# Patient Record
Sex: FEMALE | Race: WHITE | Employment: FULL TIME | URBAN - METROPOLITAN AREA
[De-identification: names, ages, dates, MRNs, and addresses within clinical notes are randomized per-mention and may not be internally consistent; named-entity substitution may affect disease eponyms.]

---

## 2017-10-10 ENCOUNTER — OFFICE VISIT (OUTPATIENT)
Dept: INTERNAL MEDICINE CLINIC | Facility: CLINIC | Age: 27
End: 2017-10-10

## 2017-10-10 VITALS
TEMPERATURE: 98 F | DIASTOLIC BLOOD PRESSURE: 73 MMHG | SYSTOLIC BLOOD PRESSURE: 107 MMHG | WEIGHT: 142 LBS | BODY MASS INDEX: 28.25 KG/M2 | HEIGHT: 59.5 IN | HEART RATE: 71 BPM

## 2017-10-10 DIAGNOSIS — R39.9 UTI SYMPTOMS: Primary | ICD-10-CM

## 2017-10-10 PROCEDURE — 99203 OFFICE O/P NEW LOW 30 MIN: CPT | Performed by: INTERNAL MEDICINE

## 2017-10-10 PROCEDURE — 81025 URINE PREGNANCY TEST: CPT | Performed by: INTERNAL MEDICINE

## 2017-10-10 RX ORDER — SULFAMETHOXAZOLE AND TRIMETHOPRIM 800; 160 MG/1; MG/1
1 TABLET ORAL 2 TIMES DAILY
Qty: 6 TABLET | Refills: 0 | Status: SHIPPED | OUTPATIENT
Start: 2017-10-10 | End: 2017-10-13

## 2017-10-10 NOTE — PROGRESS NOTES
Nuha Hahn is a 24year old female. No chief complaint on file. HPI:   UTI symptoms X 1 day  Burning , pain while urination ,urgency, frequent urination present  Previous hx of UTI, last episode was 2years ago  No fever, no rigors.  Reports s NAD. .  GI: normal BS ,no masses or tenderness. Suprapubic tenderness present, no flank tenderness   EXTREMITIES: no cyanosis, or edema.   NUERO: grossly intact          ASSESSMENT AND PLAN:   Diagnoses and all orders for this visit:    UTI symptoms  We katharina

## 2017-12-11 ENCOUNTER — TELEPHONE (OUTPATIENT)
Dept: INTERNAL MEDICINE CLINIC | Facility: CLINIC | Age: 27
End: 2017-12-11

## 2017-12-11 DIAGNOSIS — Z11.1 SCREENING-PULMONARY TB: Primary | ICD-10-CM

## 2017-12-11 NOTE — TELEPHONE ENCOUNTER
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      Pt currently scheduled for 12/13 at 4 pm.

## 2017-12-12 ENCOUNTER — NURSE ONLY (OUTPATIENT)
Dept: INTERNAL MEDICINE CLINIC | Facility: CLINIC | Age: 27
End: 2017-12-12

## 2017-12-12 DIAGNOSIS — Z11.1 ENCOUNTER FOR PPD SKIN TEST READING: Primary | ICD-10-CM

## 2017-12-12 PROCEDURE — 86580 TB INTRADERMAL TEST: CPT | Performed by: INTERNAL MEDICINE

## 2017-12-12 NOTE — PROGRESS NOTES
Pt presents for TB placement . Order in the chart for Quantiferon Pt declines would like the TB placement due to not knowing if insurance will cover quantiferon. Name and  verified. TB placement on right forearm .  Pt advised to come back for reading on

## 2017-12-14 ENCOUNTER — NURSE ONLY (OUTPATIENT)
Dept: INTERNAL MEDICINE CLINIC | Facility: CLINIC | Age: 27
End: 2017-12-14

## 2017-12-14 DIAGNOSIS — Z11.1 ENCOUNTER FOR PPD SKIN TEST READING: Primary | ICD-10-CM

## 2018-02-13 ENCOUNTER — OFFICE VISIT (OUTPATIENT)
Dept: INTERNAL MEDICINE CLINIC | Facility: CLINIC | Age: 28
End: 2018-02-13

## 2018-02-13 VITALS
SYSTOLIC BLOOD PRESSURE: 108 MMHG | WEIGHT: 137.19 LBS | BODY MASS INDEX: 27.29 KG/M2 | HEIGHT: 59.5 IN | HEART RATE: 81 BPM | TEMPERATURE: 98 F | DIASTOLIC BLOOD PRESSURE: 65 MMHG

## 2018-02-13 DIAGNOSIS — J06.9 UPPER RESPIRATORY TRACT INFECTION, UNSPECIFIED TYPE: Primary | ICD-10-CM

## 2018-02-13 LAB
CONTROL LINE PRESENT WITH A CLEAR BACKGROUND (YES/NO): YES YES/NO
KIT LOT #: NORMAL NUMERIC
STREP GRP A CUL-SCR: NEGATIVE

## 2018-02-13 PROCEDURE — 87880 STREP A ASSAY W/OPTIC: CPT | Performed by: PHYSICIAN ASSISTANT

## 2018-02-13 PROCEDURE — 99213 OFFICE O/P EST LOW 20 MIN: CPT | Performed by: PHYSICIAN ASSISTANT

## 2018-02-13 RX ORDER — AZITHROMYCIN 250 MG/1
TABLET, FILM COATED ORAL
Qty: 6 TABLET | Refills: 0 | Status: SHIPPED | OUTPATIENT
Start: 2018-02-13 | End: 2018-03-02

## 2018-02-13 NOTE — PROGRESS NOTES
HPI:    Patient ID: Ruslan Billy is a 24year old female. HPI  Patient presents today with upper respiratory symptoms for the last few days. Over the weekend she developed chills and flushing but no definite fever.  Yesterday she developed sore th rhythm and normal heart sounds. Pulmonary/Chest: Effort normal and breath sounds normal. She has no wheezes. She has no rales. Lymphadenopathy:     She has no cervical adenopathy. Neurological: She is alert. Skin: Skin is warm and dry.    Psychiatr

## 2018-02-13 NOTE — PATIENT INSTRUCTIONS
Drink plenty of fluids to stay hydrated - at least 2 liters per day   Wash hands frequently, cover your cough or sneeze, do not share drinks with others.   Cepacol lozenges, Chloroseptic throat spray, and salt water gargles (1 tsp salt in 8 oz lukewarm wate

## 2018-02-21 ENCOUNTER — LAB REQUISITION (OUTPATIENT)
Dept: LAB | Facility: HOSPITAL | Age: 28
End: 2018-02-21
Payer: COMMERCIAL

## 2018-02-21 DIAGNOSIS — Z11.3 ENCOUNTER FOR SCREENING FOR INFECTIONS WITH PREDOMINANTLY SEXUAL MODE OF TRANSMISSION: ICD-10-CM

## 2018-02-21 PROCEDURE — 87340 HEPATITIS B SURFACE AG IA: CPT | Performed by: OBSTETRICS & GYNECOLOGY

## 2018-02-21 PROCEDURE — 86803 HEPATITIS C AB TEST: CPT | Performed by: OBSTETRICS & GYNECOLOGY

## 2018-02-21 PROCEDURE — 87389 HIV-1 AG W/HIV-1&-2 AB AG IA: CPT | Performed by: OBSTETRICS & GYNECOLOGY

## 2018-02-21 PROCEDURE — 86780 TREPONEMA PALLIDUM: CPT | Performed by: OBSTETRICS & GYNECOLOGY

## 2018-02-22 ENCOUNTER — LAB REQUISITION (OUTPATIENT)
Dept: LAB | Facility: HOSPITAL | Age: 28
End: 2018-02-22
Payer: COMMERCIAL

## 2018-02-22 DIAGNOSIS — Z11.3 ENCOUNTER FOR SCREENING FOR INFECTIONS WITH PREDOMINANTLY SEXUAL MODE OF TRANSMISSION: ICD-10-CM

## 2018-02-22 DIAGNOSIS — Z11.51 ENCOUNTER FOR SCREENING FOR HUMAN PAPILLOMAVIRUS (HPV): ICD-10-CM

## 2018-02-22 DIAGNOSIS — Z01.419 ENCOUNTER FOR GYNECOLOGICAL EXAMINATION WITHOUT ABNORMAL FINDING: ICD-10-CM

## 2018-02-22 LAB
HBV SURFACE AG SERPL QL IA: NONREACTIVE
HCV AB SERPL QL IA: NONREACTIVE
HIV1+2 AB SERPL QL IA: NONREACTIVE

## 2018-02-22 PROCEDURE — 87491 CHLMYD TRACH DNA AMP PROBE: CPT | Performed by: OBSTETRICS & GYNECOLOGY

## 2018-02-22 PROCEDURE — 87624 HPV HI-RISK TYP POOLED RSLT: CPT | Performed by: OBSTETRICS & GYNECOLOGY

## 2018-02-22 PROCEDURE — 88175 CYTOPATH C/V AUTO FLUID REDO: CPT | Performed by: OBSTETRICS & GYNECOLOGY

## 2018-02-22 PROCEDURE — 87591 N.GONORRHOEAE DNA AMP PROB: CPT | Performed by: OBSTETRICS & GYNECOLOGY

## 2018-02-23 LAB
HPV I/H RISK 1 DNA SPEC QL NAA+PROBE: NEGATIVE
T PALLIDUM AB SER QL: NEGATIVE

## 2018-03-01 ENCOUNTER — NURSE TRIAGE (OUTPATIENT)
Dept: OTHER | Age: 28
End: 2018-03-01

## 2018-03-01 RX ORDER — SULFAMETHOXAZOLE AND TRIMETHOPRIM 800; 160 MG/1; MG/1
1 TABLET ORAL 2 TIMES DAILY
Qty: 6 TABLET | Refills: 0 | Status: SHIPPED | OUTPATIENT
Start: 2018-03-01 | End: 2018-03-04

## 2018-03-01 NOTE — TELEPHONE ENCOUNTER
Action Requested: Summary for Provider     []  Critical Lab, Recommendations Needed  [x] Need Additional Advice  []   FYI    []   Need Orders  [x] Need Medications Sent to Pharmacy  []  Other     SUMMARY: Pt is requesting meds to pharmacy.  She is unable to

## 2018-03-01 NOTE — TELEPHONE ENCOUNTER
Dr. Deonte Nunez, please advise regarding pt request for meds. She saw you on 10/10/17 for UTI. She is unable to come to office today for appt or to drop of urine specimen due to work.

## 2018-03-01 NOTE — TELEPHONE ENCOUNTER
She should be seen in office in next couple of days. She has to establish with a PCP.   Rx sent to pharmacy

## 2018-03-01 NOTE — TELEPHONE ENCOUNTER
I have never seen this patient. She is seen. Olayinka once.   She should be seen by 1 of the physicians as I cannot prescribe something to patient that I have never seen

## 2018-03-02 ENCOUNTER — OFFICE VISIT (OUTPATIENT)
Dept: INTERNAL MEDICINE CLINIC | Facility: CLINIC | Age: 28
End: 2018-03-02

## 2018-03-02 VITALS
DIASTOLIC BLOOD PRESSURE: 73 MMHG | HEART RATE: 90 BPM | SYSTOLIC BLOOD PRESSURE: 111 MMHG | TEMPERATURE: 98 F | HEIGHT: 59.5 IN | WEIGHT: 139.19 LBS | BODY MASS INDEX: 27.69 KG/M2

## 2018-03-02 DIAGNOSIS — N30.00 ACUTE CYSTITIS WITHOUT HEMATURIA: Primary | ICD-10-CM

## 2018-03-02 LAB
C TRACH DNA SPEC QL NAA+PROBE: NEGATIVE
N GONORRHOEA DNA SPEC QL NAA+PROBE: NEGATIVE

## 2018-03-02 PROCEDURE — 99212 OFFICE O/P EST SF 10 MIN: CPT | Performed by: INTERNAL MEDICINE

## 2018-03-02 PROCEDURE — 99213 OFFICE O/P EST LOW 20 MIN: CPT | Performed by: INTERNAL MEDICINE

## 2018-03-02 NOTE — PROGRESS NOTES
Oj Forrester is a 24year old female. Patient presents with:  UTI: f/u      HPI:   Patient is here for follow-up of UTI. She was having symptoms of lower abdominal pain, urinary burning and increased frequency that started on Monday.  She was drink (BP Location: Left arm, Cuff Size: adult)   Pulse 90   Temp 97.8 °F (36.6 °C) (Oral)   Ht 4' 11.5\" (1.511 m)   Wt 139 lb 3.2 oz (63.1 kg)   BMI 27.64 kg/m²   GENERAL: well developed, well nourished, NAD.   NECK: supple, no thyromegaly, no thyroid nodules,

## 2018-03-02 NOTE — TELEPHONE ENCOUNTER
Patient calling - verified patient's name and  - informed patient of doctor's note - patient verbalized understanding. Created appt with Dr Columba Mitchell for tomorrow at 4:20. Patient verbalized intent to comply.  Advised patient to call back if symptoms wors

## 2018-03-15 ENCOUNTER — OFFICE VISIT (OUTPATIENT)
Dept: INTERNAL MEDICINE CLINIC | Facility: CLINIC | Age: 28
End: 2018-03-15

## 2018-03-15 VITALS
BODY MASS INDEX: 27.09 KG/M2 | TEMPERATURE: 97 F | SYSTOLIC BLOOD PRESSURE: 105 MMHG | HEART RATE: 62 BPM | HEIGHT: 60 IN | WEIGHT: 138 LBS | DIASTOLIC BLOOD PRESSURE: 66 MMHG

## 2018-03-15 DIAGNOSIS — Z00.00 ANNUAL PHYSICAL EXAM: Primary | ICD-10-CM

## 2018-03-15 PROCEDURE — 99395 PREV VISIT EST AGE 18-39: CPT | Performed by: INTERNAL MEDICINE

## 2018-03-15 NOTE — PROGRESS NOTES
Mery Kelly is a 24year old female. Patient presents with:  Physical      HPI:   [facility] is here for annual physical.  She denies any complaint. Completed course of antibiotics for recent UTI in the first week of march. Her symptoms are better. numbness or headaches  ENDO: No fatigue, polyuria, polydipsia, tremors. ALLERGY/ASTHMA: No seasonal allergy or asthma. HEME: no anemia, no abnormal bleeding or easy bruising. PSYCH: Denies anxiety or feeling depressed.         EXAM:   /66 (BP Locat

## 2018-08-06 ENCOUNTER — OFFICE VISIT (OUTPATIENT)
Dept: INTERNAL MEDICINE CLINIC | Facility: CLINIC | Age: 28
End: 2018-08-06

## 2018-08-06 ENCOUNTER — NURSE TRIAGE (OUTPATIENT)
Dept: OTHER | Age: 28
End: 2018-08-06

## 2018-08-06 VITALS
DIASTOLIC BLOOD PRESSURE: 69 MMHG | HEART RATE: 67 BPM | BODY MASS INDEX: 28.47 KG/M2 | SYSTOLIC BLOOD PRESSURE: 106 MMHG | HEIGHT: 60 IN | WEIGHT: 145 LBS

## 2018-08-06 DIAGNOSIS — R39.9 UTI SYMPTOMS: Primary | ICD-10-CM

## 2018-08-06 LAB
APPEARANCE: CLEAR
BILIRUBIN: NEGATIVE
GLUCOSE (URINE DIPSTICK): NEGATIVE MG/DL
MULTISTIX LOT#: NORMAL NUMERIC
NITRITE, URINE: NEGATIVE
OCCULT BLOOD: NEGATIVE
PH, URINE: 5 (ref 4.5–8)
PROTEIN (URINE DIPSTICK): NEGATIVE MG/DL
SPECIFIC GRAVITY: 1.01 (ref 1–1.03)
URINE-COLOR: YELLOW
UROBILINOGEN,SEMI-QN: NEGATIVE MG/DL (ref 0–1.9)

## 2018-08-06 PROCEDURE — 99213 OFFICE O/P EST LOW 20 MIN: CPT | Performed by: INTERNAL MEDICINE

## 2018-08-06 PROCEDURE — 99212 OFFICE O/P EST SF 10 MIN: CPT | Performed by: INTERNAL MEDICINE

## 2018-08-06 PROCEDURE — 81002 URINALYSIS NONAUTO W/O SCOPE: CPT | Performed by: INTERNAL MEDICINE

## 2018-08-06 NOTE — TELEPHONE ENCOUNTER
Action Requested: Summary for Provider     []  Critical Lab, Recommendations Needed  [] Need Additional Advice  []   FYI    []   Need Orders  [] Need Medications Sent to Pharmacy  []  Other     SUMMARY: appt scheduled today to see Dr Giuseppe Wright.   PCP no acces

## 2018-08-06 NOTE — PROGRESS NOTES
HPI:    Patient ID: Shannon Ryder is a 24year old female. Pt woke last night with urinary discomfort. Dysuria    This is a new problem. The current episode started today. The problem occurs every urination. The problem has been resolved.  The no CVA tenderness. Neurological: She is alert and oriented to person, place, and time. ASSESSMENT/PLAN:     1. UTI symptoms  Pt had mild symptoms this morning which resolved. Will send the urine for culture and treat only if it is positive.

## 2018-08-10 ENCOUNTER — TELEPHONE (OUTPATIENT)
Dept: INTERNAL MEDICINE CLINIC | Facility: CLINIC | Age: 28
End: 2018-08-10

## 2018-08-10 NOTE — TELEPHONE ENCOUNTER
Pt called in requesting to speak with a nurse in regards to her lab results from 8/6.   Please advise

## 2018-08-14 ENCOUNTER — PATIENT MESSAGE (OUTPATIENT)
Dept: INTERNAL MEDICINE CLINIC | Facility: CLINIC | Age: 28
End: 2018-08-14

## 2018-08-15 ENCOUNTER — TELEPHONE (OUTPATIENT)
Dept: OTHER | Age: 28
End: 2018-08-15

## 2018-08-15 ENCOUNTER — APPOINTMENT (OUTPATIENT)
Dept: LAB | Facility: HOSPITAL | Age: 28
End: 2018-08-15
Attending: INTERNAL MEDICINE
Payer: COMMERCIAL

## 2018-08-15 ENCOUNTER — NURSE TRIAGE (OUTPATIENT)
Dept: OTHER | Age: 28
End: 2018-08-15

## 2018-08-15 ENCOUNTER — OFFICE VISIT (OUTPATIENT)
Dept: INTERNAL MEDICINE CLINIC | Facility: CLINIC | Age: 28
End: 2018-08-15

## 2018-08-15 VITALS
HEART RATE: 78 BPM | WEIGHT: 143.19 LBS | TEMPERATURE: 98 F | DIASTOLIC BLOOD PRESSURE: 70 MMHG | SYSTOLIC BLOOD PRESSURE: 119 MMHG | BODY MASS INDEX: 28.11 KG/M2 | HEIGHT: 60 IN

## 2018-08-15 DIAGNOSIS — Z11.3 SCREENING FOR STD (SEXUALLY TRANSMITTED DISEASE): ICD-10-CM

## 2018-08-15 DIAGNOSIS — K21.9 GASTROESOPHAGEAL REFLUX DISEASE, ESOPHAGITIS PRESENCE NOT SPECIFIED: Primary | ICD-10-CM

## 2018-08-15 PROCEDURE — 87340 HEPATITIS B SURFACE AG IA: CPT

## 2018-08-15 PROCEDURE — 36415 COLL VENOUS BLD VENIPUNCTURE: CPT

## 2018-08-15 PROCEDURE — 86780 TREPONEMA PALLIDUM: CPT

## 2018-08-15 PROCEDURE — 99212 OFFICE O/P EST SF 10 MIN: CPT | Performed by: INTERNAL MEDICINE

## 2018-08-15 PROCEDURE — 87389 HIV-1 AG W/HIV-1&-2 AB AG IA: CPT

## 2018-08-15 PROCEDURE — 99213 OFFICE O/P EST LOW 20 MIN: CPT | Performed by: INTERNAL MEDICINE

## 2018-08-15 PROCEDURE — 87591 N.GONORRHOEAE DNA AMP PROB: CPT

## 2018-08-15 PROCEDURE — 86803 HEPATITIS C AB TEST: CPT

## 2018-08-15 PROCEDURE — 87491 CHLMYD TRACH DNA AMP PROBE: CPT

## 2018-08-15 RX ORDER — PANTOPRAZOLE SODIUM 40 MG/1
40 TABLET, DELAYED RELEASE ORAL
Qty: 30 TABLET | Refills: 0 | Status: SHIPPED | OUTPATIENT
Start: 2018-08-15 | End: 2018-09-14

## 2018-08-15 NOTE — PROGRESS NOTES
Ross Mathew is a 34year old female. Patient presents with:  Abdominal Pain  STD      HPI:     HPI  Patient is here with complaints of abdominal pain since monday. She was thinking it was something she probably ate.  Pain is aggravated with eating Constitutional: Negative for chills, fever, malaise/fatigue and weight loss. HENT: Negative. Eyes: Negative. Respiratory: Negative. Cardiovascular: Negative. Gastrointestinal: Positive for abdominal pain and nausea.  Negative for blood in st week.  If no improvement of symptoms, develops fever, chills, vomiting, change in the bowel movements or worsening of abdominal pain, call immediately. Will consider imaging. Screening for STD (sexually transmitted disease)  -     HCV ANTIBODY;  Future

## 2018-08-15 NOTE — TELEPHONE ENCOUNTER
Please inform the patient to follow-up in the office if she has persisting symptoms to be evaluated.

## 2018-08-15 NOTE — TELEPHONE ENCOUNTER
Action Requested: Summary for Provider     []  Critical Lab, Recommendations Needed  [x] Need Additional Advice  []   FYI    []   Need Orders  [] Need Medications Sent to Pharmacy  []  Other     SUMMARY: see [employer] message below.  Pt states she started hav

## 2018-08-15 NOTE — TELEPHONE ENCOUNTER
Patient calling nandini stated she took a Chlamydia/Gonococcus urine test today. She took I upon herself to use a wet wipe unused in the bathroom to wipe herself before the test.  She is questioning if the results will come back inaccurate.     I called the

## 2018-08-15 NOTE — TELEPHONE ENCOUNTER
----- Message from Sam Salter sent at 8/14/2018  9:08 PM CDT -----  Regarding: Non-Urgent Medical Question  Contact: [phone]  Over the last two days I have been experiencing a slight pain/discomfort in my chest under the bra line and on the

## 2018-08-15 NOTE — TELEPHONE ENCOUNTER
From: Stacey Hassan  To: Rakel Mccormack MD  Sent: 8/14/2018 9:08 PM CDT  Subject: Non-Urgent Medical Question    Over the last two days I have been experiencing a slight pain/discomfort in my chest under the bra line and on the left side of my abdomin

## 2018-08-16 LAB
C TRACH DNA SPEC QL NAA+PROBE: NEGATIVE
HBV SURFACE AG SERPL QL IA: NONREACTIVE
HCV AB SERPL QL IA: NONREACTIVE
HIV1+2 AB SERPL QL IA: NONREACTIVE
N GONORRHOEA DNA SPEC QL NAA+PROBE: NEGATIVE

## 2018-08-17 LAB — T PALLIDUM AB SER QL: NEGATIVE

## 2018-08-20 ENCOUNTER — TELEPHONE (OUTPATIENT)
Dept: OTHER | Age: 28
End: 2018-08-20

## 2018-08-20 NOTE — TELEPHONE ENCOUNTER
HIV AG AB COMBO   Order: [id number]   Collected:  8/15/2018  4:23 PM Status:  Final result Dx:  Screening for STD (sexually transmitt. .. Notes recorded by Crista Paget, LPN on 2/23/4627 at 2:72 PM CDT  LMTCB.  If patient calls please transfer to [id number]

## 2018-10-04 ENCOUNTER — OFFICE VISIT (OUTPATIENT)
Dept: INTERNAL MEDICINE CLINIC | Facility: CLINIC | Age: 28
End: 2018-10-04

## 2018-10-04 VITALS
HEIGHT: 60 IN | BODY MASS INDEX: 28.01 KG/M2 | WEIGHT: 142.69 LBS | HEART RATE: 93 BPM | DIASTOLIC BLOOD PRESSURE: 73 MMHG | SYSTOLIC BLOOD PRESSURE: 104 MMHG | TEMPERATURE: 98 F

## 2018-10-04 DIAGNOSIS — J06.9 UPPER RESPIRATORY TRACT INFECTION, UNSPECIFIED TYPE: Primary | ICD-10-CM

## 2018-10-04 PROCEDURE — 99212 OFFICE O/P EST SF 10 MIN: CPT | Performed by: INTERNAL MEDICINE

## 2018-10-04 PROCEDURE — 99213 OFFICE O/P EST LOW 20 MIN: CPT | Performed by: INTERNAL MEDICINE

## 2018-10-04 RX ORDER — AZITHROMYCIN 250 MG/1
TABLET, FILM COATED ORAL
Qty: 6 TABLET | Refills: 0 | Status: SHIPPED | OUTPATIENT
Start: 2018-10-04 | End: 2018-10-11 | Stop reason: ALTCHOICE

## 2018-10-04 NOTE — PROGRESS NOTES
Charo Zavala is a 34year old female. Patient presents with:  Cold: pt c/o runny nose, sore throat, clogged ears x 2 days  Diarrhea      HPI:     HPI    Patient is here with symtoms of nasal congestion, sore throat and clogged ears for last 2 days. redness. Respiratory: Positive for cough. Negative for hemoptysis, sputum production, shortness of breath and wheezing. Cardiovascular: Negative for chest pain and leg swelling. Gastrointestinal: Positive for diarrhea.  Negative for abdominal pain, c improve in 1 or 2 days. Informed the patient to call me back when she returns if the symptoms fail to improve. She will be back by Sunday. Can continue Flonase and take OTC Allegra/Claritin for running nose. Stay hydrated.   Drink plenty of fluids to st

## 2018-10-08 ENCOUNTER — TELEPHONE (OUTPATIENT)
Dept: OTHER | Age: 28
End: 2018-10-08

## 2018-10-08 DIAGNOSIS — R05.9 COUGH: Primary | ICD-10-CM

## 2018-10-08 NOTE — TELEPHONE ENCOUNTER
Talked to the patinet. If symptoms persists 1 more day with no improvement, please do a cxr. Ordered in epic.she verbalizes understanding. Denies fever or chills. Symptoms are moderately better since taking the antibiotics.

## 2018-10-08 NOTE — TELEPHONE ENCOUNTER
Received a call from patient who reports she saw Dr. Sebastian Uribe on 10/4/18 for a URI and was prescribed azithromycin. Is taking the azithromycin as prescribed. Patient reports she is still coughing up yellow-colored sputum and nasal drainage.  Patient also repor

## 2018-10-11 ENCOUNTER — HOSPITAL ENCOUNTER (OUTPATIENT)
Dept: GENERAL RADIOLOGY | Facility: HOSPITAL | Age: 28
Discharge: HOME | End: 2018-10-11
Attending: INTERNAL MEDICINE
Payer: COMMERCIAL

## 2018-10-11 ENCOUNTER — OFFICE VISIT (OUTPATIENT)
Dept: INTERNAL MEDICINE CLINIC | Facility: CLINIC | Age: 28
End: 2018-10-11

## 2018-10-11 VITALS
HEIGHT: 60 IN | SYSTOLIC BLOOD PRESSURE: 105 MMHG | TEMPERATURE: 98 F | BODY MASS INDEX: 28.27 KG/M2 | WEIGHT: 144 LBS | HEART RATE: 68 BPM | DIASTOLIC BLOOD PRESSURE: 68 MMHG

## 2018-10-11 DIAGNOSIS — R05.9 COUGH: ICD-10-CM

## 2018-10-11 DIAGNOSIS — R05.9 COUGH: Primary | ICD-10-CM

## 2018-10-11 PROCEDURE — 71046 X-RAY EXAM CHEST 2 VIEWS: CPT | Performed by: INTERNAL MEDICINE

## 2018-10-11 PROCEDURE — 99213 OFFICE O/P EST LOW 20 MIN: CPT | Performed by: INTERNAL MEDICINE

## 2018-10-11 PROCEDURE — 99212 OFFICE O/P EST SF 10 MIN: CPT | Performed by: INTERNAL MEDICINE

## 2018-10-11 NOTE — PROGRESS NOTES
Peggy Rogers is a 34year old female. Patient presents with:  URI: f/u patient is requesting a chest x-ray      HPI:     HPI   Patient is here for follow-up of cough. She was seen last week for cough, sweats and chest discomfort.   She was given a Neurological: Negative for dizziness and headaches. Endo/Heme/Allergies: Negative for environmental allergies. Psychiatric/Behavioral: The patient does not have insomnia.           EXAM:   /68 (BP Location: Right arm, Patient Position: Sitting,

## 2018-12-20 ENCOUNTER — OFFICE VISIT (OUTPATIENT)
Dept: INTERNAL MEDICINE CLINIC | Facility: CLINIC | Age: 28
End: 2018-12-20

## 2018-12-20 VITALS
HEART RATE: 69 BPM | HEIGHT: 60 IN | DIASTOLIC BLOOD PRESSURE: 71 MMHG | SYSTOLIC BLOOD PRESSURE: 121 MMHG | TEMPERATURE: 98 F | BODY MASS INDEX: 27.8 KG/M2 | WEIGHT: 141.63 LBS

## 2018-12-20 DIAGNOSIS — K12.0 CANKER SORES ORAL: Primary | ICD-10-CM

## 2018-12-20 DIAGNOSIS — N89.8 VAGINAL DRYNESS: ICD-10-CM

## 2018-12-20 PROCEDURE — 99213 OFFICE O/P EST LOW 20 MIN: CPT | Performed by: INTERNAL MEDICINE

## 2018-12-20 PROCEDURE — 99212 OFFICE O/P EST SF 10 MIN: CPT | Performed by: INTERNAL MEDICINE

## 2018-12-20 NOTE — PROGRESS NOTES
Nunu Esquivel is a 34year old female. Patient presents with:  Mouth Cold Sores (respiratory/integumentary)      HPI:     HPI  Patient is here for open ulcer under her tongue. It started 3 days ago. She usually gets oral ulcers.   She never had it and headaches. Endo/Heme/Allergies: Negative for environmental allergies. Psychiatric/Behavioral: The patient does not have insomnia. EXAM:   /71 (BP Location: Right arm, Patient Position: Sitting, Cuff Size: adult)   Pulse 69   Temp 98.

## 2018-12-23 ENCOUNTER — E-VISIT (OUTPATIENT)
Dept: FAMILY MEDICINE CLINIC | Facility: CLINIC | Age: 28
End: 2018-12-23

## 2018-12-23 DIAGNOSIS — R30.0 DYSURIA: Primary | ICD-10-CM

## 2018-12-23 PROCEDURE — 98969 ONLINE SERVICE BY HC PRO: CPT | Performed by: NURSE PRACTITIONER

## 2018-12-23 RX ORDER — CEPHALEXIN 500 MG/1
500 TABLET ORAL 2 TIMES DAILY
Qty: 14 TABLET | Refills: 0 | Status: SHIPPED | OUTPATIENT
Start: 2018-12-23 | End: 2018-12-30

## 2019-01-29 ENCOUNTER — OFFICE VISIT (OUTPATIENT)
Dept: INTERNAL MEDICINE CLINIC | Facility: CLINIC | Age: 29
End: 2019-01-29

## 2019-01-29 ENCOUNTER — NURSE TRIAGE (OUTPATIENT)
Dept: INTERNAL MEDICINE CLINIC | Facility: CLINIC | Age: 29
End: 2019-01-29

## 2019-01-29 VITALS
WEIGHT: 144.63 LBS | SYSTOLIC BLOOD PRESSURE: 104 MMHG | BODY MASS INDEX: 28.39 KG/M2 | TEMPERATURE: 98 F | DIASTOLIC BLOOD PRESSURE: 69 MMHG | HEART RATE: 64 BPM | HEIGHT: 60 IN

## 2019-01-29 DIAGNOSIS — J02.9 PHARYNGITIS, UNSPECIFIED ETIOLOGY: Primary | ICD-10-CM

## 2019-01-29 PROCEDURE — 87880 STREP A ASSAY W/OPTIC: CPT | Performed by: INTERNAL MEDICINE

## 2019-01-29 PROCEDURE — 99212 OFFICE O/P EST SF 10 MIN: CPT | Performed by: INTERNAL MEDICINE

## 2019-01-29 PROCEDURE — 99213 OFFICE O/P EST LOW 20 MIN: CPT | Performed by: INTERNAL MEDICINE

## 2019-01-29 NOTE — TELEPHONE ENCOUNTER
Action Requested: Summary for Provider     []  Critical Lab, Recommendations Needed  [] Need Additional Advice  []   FYI    []   Need Orders  [] Need Medications Sent to Pharmacy  []  Other     SUMMARY: Patient scheduled at [facility] for today 1/29/19 3:40pm wi

## 2019-02-08 ENCOUNTER — PATIENT MESSAGE (OUTPATIENT)
Dept: INTERNAL MEDICINE CLINIC | Facility: CLINIC | Age: 29
End: 2019-02-08

## 2019-02-08 ENCOUNTER — NURSE TRIAGE (OUTPATIENT)
Dept: OTHER | Age: 29
End: 2019-02-08

## 2019-02-08 RX ORDER — CEFUROXIME AXETIL 500 MG/1
500 TABLET ORAL 2 TIMES DAILY
Qty: 20 TABLET | Refills: 0 | Status: SHIPPED | OUTPATIENT
Start: 2019-02-08 | End: 2019-02-18

## 2019-02-08 NOTE — TELEPHONE ENCOUNTER
Action Requested: Summary for Provider     []  Critical Lab, Recommendations Needed  [] Need Additional Advice  []   FYI    []   Need Orders  [] Need Medications Sent to Pharmacy  []  Other     SUMMARY:   Patient states she continues to blow her nose iván

## 2019-02-08 NOTE — TELEPHONE ENCOUNTER
LMTCB to further discuss symptoms-also sent [email or web address] message to return call to triage. To: EM KOFI Avendaño      From: Lisbeth Ronquillo      Created: 2/8/2019 7:53 AM        *-*-*This message has not been handled. *-*-*    Dr. Carla Sauer,   I had a visit

## 2019-02-08 NOTE — TELEPHONE ENCOUNTER
From: Alex Evans  To: Aria Martínez MD  Sent: 2/8/2019 7:53 AM CST  Subject: Visit Follow-up Question    Dr. Trinidad Chu,   I had a visit with you on 1/29/19 for a sore throat and stuffy nose.  We did a strep test, which came back normal. However, I'

## 2019-02-08 NOTE — TELEPHONE ENCOUNTER
Pt returned call. Informed of Dr. Sandrita Rust message and antibiotics. Advised to call back if not better after antibiotics is finished. Pt verbalized understanding.

## 2019-02-25 ENCOUNTER — OFFICE VISIT (OUTPATIENT)
Dept: INTERNAL MEDICINE CLINIC | Facility: CLINIC | Age: 29
End: 2019-02-25

## 2019-02-25 VITALS
TEMPERATURE: 98 F | SYSTOLIC BLOOD PRESSURE: 104 MMHG | HEART RATE: 68 BPM | WEIGHT: 142 LBS | DIASTOLIC BLOOD PRESSURE: 69 MMHG | HEIGHT: 60 IN | BODY MASS INDEX: 27.88 KG/M2

## 2019-02-25 DIAGNOSIS — R09.82 POST-NASAL DRIP: Primary | ICD-10-CM

## 2019-02-25 PROCEDURE — 99213 OFFICE O/P EST LOW 20 MIN: CPT | Performed by: INTERNAL MEDICINE

## 2019-02-25 PROCEDURE — 99212 OFFICE O/P EST SF 10 MIN: CPT | Performed by: INTERNAL MEDICINE

## 2019-02-25 RX ORDER — FLUTICASONE PROPIONATE 50 MCG
1 SPRAY, SUSPENSION (ML) NASAL DAILY
Qty: 1 BOTTLE | Refills: 0 | Status: SHIPPED | OUTPATIENT
Start: 2019-02-25 | End: 2019-07-23

## 2019-02-25 NOTE — PROGRESS NOTES
John Paul Gonzalez is a 34year old female. Patient presents with:  Cold      HPI:     HPI   Patient is here for URI symptoms that started 3 weeks ago. She was seen in the clinic on 2/8/2018 and was prescribed cefuroxime. She took 2 doses of it.   Felt Ht 5' (1.524 m)   Wt 142 lb (64.4 kg)   BMI 27.73 kg/m²   Physical Exam   Constitutional: She is oriented to person, place, and time. She appears well-developed. No distress. HENT:   Head: Normocephalic and atraumatic.    Right Ear: Tympanic membrane norm

## 2019-02-28 ENCOUNTER — LAB REQUISITION (OUTPATIENT)
Dept: LAB | Facility: HOSPITAL | Age: 29
End: 2019-02-28
Payer: COMMERCIAL

## 2019-02-28 DIAGNOSIS — Z01.419 ENCOUNTER FOR GYNECOLOGICAL EXAMINATION WITHOUT ABNORMAL FINDING: ICD-10-CM

## 2019-02-28 DIAGNOSIS — Z11.3 ENCOUNTER FOR SCREENING FOR INFECTIONS WITH PREDOMINANTLY SEXUAL MODE OF TRANSMISSION: ICD-10-CM

## 2019-02-28 PROCEDURE — 87591 N.GONORRHOEAE DNA AMP PROB: CPT | Performed by: OBSTETRICS & GYNECOLOGY

## 2019-02-28 PROCEDURE — 87491 CHLMYD TRACH DNA AMP PROBE: CPT | Performed by: OBSTETRICS & GYNECOLOGY

## 2019-02-28 PROCEDURE — 88175 CYTOPATH C/V AUTO FLUID REDO: CPT | Performed by: OBSTETRICS & GYNECOLOGY

## 2019-03-01 LAB
C TRACH DNA SPEC QL NAA+PROBE: NEGATIVE
N GONORRHOEA DNA SPEC QL NAA+PROBE: NEGATIVE

## 2019-03-11 ENCOUNTER — TELEPHONE (OUTPATIENT)
Dept: OTHER | Age: 29
End: 2019-03-11

## 2019-03-11 ENCOUNTER — NURSE TRIAGE (OUTPATIENT)
Dept: OTHER | Age: 29
End: 2019-03-11

## 2019-03-11 ENCOUNTER — OFFICE VISIT (OUTPATIENT)
Dept: INTERNAL MEDICINE CLINIC | Facility: CLINIC | Age: 29
End: 2019-03-11

## 2019-03-11 VITALS
SYSTOLIC BLOOD PRESSURE: 116 MMHG | DIASTOLIC BLOOD PRESSURE: 82 MMHG | HEART RATE: 108 BPM | TEMPERATURE: 101 F | BODY MASS INDEX: 28.47 KG/M2 | WEIGHT: 145 LBS | HEIGHT: 60 IN

## 2019-03-11 DIAGNOSIS — R68.89 FLU-LIKE SYMPTOMS: Primary | ICD-10-CM

## 2019-03-11 LAB
FLUAV + FLUBV RNA SPEC NAA+PROBE: NEGATIVE
FLUAV + FLUBV RNA SPEC NAA+PROBE: NEGATIVE
FLUAV + FLUBV RNA SPEC NAA+PROBE: POSITIVE

## 2019-03-11 PROCEDURE — 99213 OFFICE O/P EST LOW 20 MIN: CPT | Performed by: PHYSICIAN ASSISTANT

## 2019-03-11 PROCEDURE — 99212 OFFICE O/P EST SF 10 MIN: CPT | Performed by: PHYSICIAN ASSISTANT

## 2019-03-11 RX ORDER — OSELTAMIVIR PHOSPHATE 75 MG/1
75 CAPSULE ORAL 2 TIMES DAILY
Qty: 10 CAPSULE | Refills: 0 | Status: SHIPPED | OUTPATIENT
Start: 2019-03-11 | End: 2019-03-16

## 2019-03-11 NOTE — PROGRESS NOTES
HPI:    Patient ID: Johanna Sommers is a 34year old female. HPI   Patient presents today c/o fever, sweating, chills that began yesterday. She also has a mildly productive cough, sore throat, and ear pain.  This morning her symptoms acutely worsened warm and dry. Psychiatric: She has a normal mood and affect. ASSESSMENT/PLAN:   1. Flu-like symptoms  Patient here with concerns of abrupt onset upper respiratory symptoms. Symptoms acutely worsened this morning.   Physical exam is largely u

## 2019-03-11 NOTE — TELEPHONE ENCOUNTER
Received a call from Jose Kimbrough from lab who reports patient is positive for Influenza A. Terri Cornejo PA-C was made aware and no new order was received at this time. Encounter routed to provider.    Please reply to pool: PACHECO Arteaga

## 2019-03-11 NOTE — TELEPHONE ENCOUNTER
See result note. Results given to patient over the phone. Advised to start on Tamiflu twice daily for 5 days. Continue supportive care. Follow-up if not soon better.

## 2019-03-11 NOTE — TELEPHONE ENCOUNTER
Patient called for influenza test results showing still in process. Please call patient with results.

## 2019-03-11 NOTE — TELEPHONE ENCOUNTER
Action Requested: Summary for Provider     []  Critical Lab, Recommendations Needed  [] Need Additional Advice  []   FYI    []   Need Orders  [] Need Medications Sent to Pharmacy  []  Other     SUMMARY: Spoke with patient who reports she started feeling si

## 2019-05-22 ENCOUNTER — LAB REQUISITION (OUTPATIENT)
Dept: LAB | Facility: HOSPITAL | Age: 29
End: 2019-05-22
Payer: COMMERCIAL

## 2019-05-22 DIAGNOSIS — R87.619 ABNORMAL CYTOLOGICAL FINDING IN SPECIMEN FROM CERVIX UTERI: ICD-10-CM

## 2019-05-22 PROCEDURE — 88305 TISSUE EXAM BY PATHOLOGIST: CPT | Performed by: OBSTETRICS & GYNECOLOGY

## 2019-07-22 ENCOUNTER — PATIENT MESSAGE (OUTPATIENT)
Dept: INTERNAL MEDICINE CLINIC | Facility: CLINIC | Age: 29
End: 2019-07-22

## 2019-07-22 ENCOUNTER — TELEPHONE (OUTPATIENT)
Dept: OTHER | Age: 29
End: 2019-07-22

## 2019-07-22 NOTE — TELEPHONE ENCOUNTER
Pt returned call, verified . Reviewed doctor's referral information as noted below. Also reviewed psychiatrist vs psychologist with pt. Pt states she prefers to see a female psychiatrist, informed her Dr. Carina Nielsen is a female.  Pt will call Dr. Valarie Hemphill office

## 2019-07-22 NOTE — TELEPHONE ENCOUNTER
Action Requested: Summary for Provider     []  Critical Lab, Recommendations Needed  [] Need Additional Advice  [x]   FYI    []   Need Orders  [] Need Medications Sent to Pharmacy  []  Other     SUMMARY: Patient calling with complaint of intermittent chest

## 2019-07-22 NOTE — TELEPHONE ENCOUNTER
Patient calling with complaint of intermittent chest discomfort that started 2-3 weeks ago. Pain currently rated a 2/10. Discomfort is in the middle of the chest per patient. Patient denies difficulty breathing.    Patient unsure if chest discomfort r

## 2019-07-22 NOTE — TELEPHONE ENCOUNTER
Action Requested: Summary for Provider     []  Critical Lab, Recommendations Needed  [x] Need Additional Advice  []   FYI    []   Need Orders  [] Need Medications Sent to Pharmacy  []  Other     SUMMARY: Patient calling with complaint of depression and anx

## 2019-07-22 NOTE — TELEPHONE ENCOUNTER
It could be due to anxiety. I agree with the advice given. Patient can follow-up with me as scheduled.

## 2019-07-22 NOTE — TELEPHONE ENCOUNTER
I have placed the referral for psychiatry. Please give information.   Can take couple weeks to months to get an with a psychiatrist.  I have also placed at [facility] navigator referral.  She will be contacted in the next 2 business days with information

## 2019-07-22 NOTE — TELEPHONE ENCOUNTER
See TE 7/22/19 ACUTE.       From: Rabia Harding  To: To Forrester MD  Sent: 7/22/2019 10:03 AM CDT  Subject: Referral Request    I am looking for an in-network therapist/psychologist to go to and was wondering if you had any suggestions of someone I

## 2019-07-22 NOTE — TELEPHONE ENCOUNTER
[facility]  [employer] message sent.      ----- Message from Ankit Quiroga.  Sharon Epstein sent at 7/22/2019 10:03 AM CDT -----  Regarding: Referral Request  Contact: [phone]  I am looking for an in-network therapist/p

## 2019-07-23 NOTE — PROGRESS NOTES
Nuha Hahn is a 34year old female. Patient presents with: Anxiety: pt is expierencing chest discomfort and anxiety for about 2 weeks. pt denies any pain now  Chest Pain      HPI:     HPI  Patient is here to discuss anxiety.   Reports that for th malaise/fatigue and weight loss. Eyes: Negative. Respiratory: Negative. Cardiovascular: Negative. Genitourinary: Negative. Neurological: Negative. Psychiatric/Behavioral: Positive for depression.  Negative for hallucinations, memory loss, s

## 2019-12-05 ENCOUNTER — OFFICE VISIT (OUTPATIENT)
Dept: INTERNAL MEDICINE CLINIC | Facility: CLINIC | Age: 29
End: 2019-12-05

## 2019-12-05 VITALS
SYSTOLIC BLOOD PRESSURE: 93 MMHG | TEMPERATURE: 97 F | BODY MASS INDEX: 28.27 KG/M2 | HEIGHT: 60 IN | RESPIRATION RATE: 16 BRPM | HEART RATE: 69 BPM | WEIGHT: 144 LBS | DIASTOLIC BLOOD PRESSURE: 62 MMHG

## 2019-12-05 DIAGNOSIS — Z23 NEED FOR VACCINATION: ICD-10-CM

## 2019-12-05 DIAGNOSIS — Z00.00 PHYSICAL EXAM: Primary | ICD-10-CM

## 2019-12-05 DIAGNOSIS — F41.9 ANXIETY: ICD-10-CM

## 2019-12-05 PROCEDURE — 90686 IIV4 VACC NO PRSV 0.5 ML IM: CPT | Performed by: INTERNAL MEDICINE

## 2019-12-05 PROCEDURE — 99395 PREV VISIT EST AGE 18-39: CPT | Performed by: INTERNAL MEDICINE

## 2019-12-05 PROCEDURE — 90471 IMMUNIZATION ADMIN: CPT | Performed by: INTERNAL MEDICINE

## 2019-12-05 RX ORDER — ALPRAZOLAM 0.25 MG/1
0.25 TABLET ORAL DAILY PRN
Qty: 10 TABLET | Refills: 0 | Status: SHIPPED | OUTPATIENT
Start: 2019-12-05 | End: 2019-12-23 | Stop reason: ALTCHOICE

## 2019-12-05 NOTE — PATIENT INSTRUCTIONS
Prevention Guidelines, Women Ages 25 to 44  Screening tests and vaccines are an important part of managing your health. A screening test is done to find possible disorders or diseases in people who don't have any symptoms.  The goal is to find a disease e Type 2 diabetes, prediabetes All women diagnosed with gestational diabetes Lifelong testing every 3 years   Type 2 diabetes All women with prediabetes Every year   Gonorrhea Sexually active women at increased risk for infection At routine exams   Hepatitis Measles, mumps, rubella (MMR) All women in this age group who have no record of these infections or vaccines 1 or 2 doses   Meningococcal Women at increased risk for infection should talk with their healthcare provider 1 or more doses   Pneumococcal conjug © 7692-8491 [employer] [address] [facility], [facility] [place]. All rights reserved. This information is not intended as a substitute for professional medical care. Always follow your healthcare professional's instructions.

## 2019-12-05 NOTE — PROGRESS NOTES
Shannon Ryder is a 34year old female.   Patient presents with:  Physical: flu shot      HPI:   Pt comes for physical  C/c physical  C/o anxiety -sees a therapist and wondering if she could be on low dose anxiety med -- has this anxiety on Sunday the anxious,no palpitations belly pain heartburn nausea vomiting no pain when you urinate change in the color urine discharge down there urinating frequently urinating frequently and I do sometimes get discharge or  swelling in feet  GI: denies abdominal pain even half to see how she feels      Preventative medicine   Pap- 2/19 normal   Labs to be done once fasting      The patient indicates understanding of these issues and agrees to the plan. No follow-ups on file.

## 2019-12-23 ENCOUNTER — LAB ENCOUNTER (OUTPATIENT)
Dept: LAB | Age: 29
End: 2019-12-23
Attending: INTERNAL MEDICINE
Payer: COMMERCIAL

## 2019-12-23 DIAGNOSIS — Z00.00 PHYSICAL EXAM: ICD-10-CM

## 2019-12-23 PROCEDURE — 36415 COLL VENOUS BLD VENIPUNCTURE: CPT

## 2019-12-23 PROCEDURE — 85025 COMPLETE CBC W/AUTO DIFF WBC: CPT

## 2019-12-23 PROCEDURE — 80053 COMPREHEN METABOLIC PANEL: CPT

## 2019-12-23 PROCEDURE — 80061 LIPID PANEL: CPT

## 2019-12-23 PROCEDURE — 84443 ASSAY THYROID STIM HORMONE: CPT

## 2019-12-23 NOTE — PROGRESS NOTES
Rabia Harding is a 34year old female.   Patient presents with:  Pain: pain on right side of back and upper abdomen x 3 days       HPI:   Patient comes as an urgent visit  C/C right sided pain   C/o pain right side \"pelvic region \" x 3 days --got wo bladder      Past Surgical History:   Procedure Laterality Date   • Other surgical history  12-1-09    flow, [facility] dr Marilu Kay   • Other surgical history  9/28/10    PVR, Dr. Tad Way   • Other surgical history      wisdom teeth      Social History:  Social History patient to drink plenty of fluids and not to hold her urine as she has been as she does not like to use public restrooms  RUQ pain  -     US LIVER (CPT=76705);  Future  We will check liver ultrasound  Acute cystitis without hematuria  -     Sulfamethoxazole

## 2020-01-03 ENCOUNTER — HOSPITAL ENCOUNTER (OUTPATIENT)
Dept: ULTRASOUND IMAGING | Age: 30
Discharge: HOME | End: 2020-01-03
Attending: INTERNAL MEDICINE
Payer: COMMERCIAL

## 2020-01-03 DIAGNOSIS — R10.11 RUQ PAIN: ICD-10-CM

## 2020-01-03 PROCEDURE — 76700 US EXAM ABDOM COMPLETE: CPT | Performed by: INTERNAL MEDICINE

## 2020-01-05 DIAGNOSIS — K82.4 GALLBLADDER POLYP: Primary | ICD-10-CM

## 2020-01-22 ENCOUNTER — OFFICE VISIT (OUTPATIENT)
Dept: SURGERY | Facility: CLINIC | Age: 30
End: 2020-01-22

## 2020-01-22 VITALS — BODY MASS INDEX: 28.66 KG/M2 | HEIGHT: 60 IN | WEIGHT: 146 LBS

## 2020-01-22 DIAGNOSIS — K82.4 GALLBLADDER POLYP: Primary | ICD-10-CM

## 2020-01-22 PROCEDURE — 99244 OFF/OP CNSLTJ NEW/EST MOD 40: CPT | Performed by: SURGERY

## 2020-01-23 NOTE — PROGRESS NOTES
History and Physical      Claude Diego is a 34year old female. HPI   Patient presents with:  Gallbladder: Pt referred by Dr. Christina Kennedy regarding;  gallbladder polyp. Pt states she had RUQ pain.   Pt states she was treated for UTI and RUQ pain tena (66.2 kg)   BMI 28.51 kg/m²  No LMP recorded.   Constitutional: appears well hydrated alert and responsive no acute distress noted  Head/Face: normocephalic  Nose/Mouth/Throat: nose and throat are clear palate is intact mucous membranes are moist no oral le

## 2020-03-10 ENCOUNTER — NURSE TRIAGE (OUTPATIENT)
Dept: OTHER | Age: 30
End: 2020-03-10

## 2020-03-10 ENCOUNTER — OFFICE VISIT (OUTPATIENT)
Dept: INTERNAL MEDICINE CLINIC | Facility: CLINIC | Age: 30
End: 2020-03-10

## 2020-03-10 VITALS
WEIGHT: 141.38 LBS | HEART RATE: 72 BPM | SYSTOLIC BLOOD PRESSURE: 100 MMHG | DIASTOLIC BLOOD PRESSURE: 61 MMHG | BODY MASS INDEX: 26.69 KG/M2 | TEMPERATURE: 98 F | HEIGHT: 61 IN | OXYGEN SATURATION: 97 %

## 2020-03-10 DIAGNOSIS — R07.89 CHEST TIGHTNESS: Primary | ICD-10-CM

## 2020-03-10 PROCEDURE — 99213 OFFICE O/P EST LOW 20 MIN: CPT | Performed by: PHYSICIAN ASSISTANT

## 2020-03-10 RX ORDER — ALBUTEROL SULFATE 90 UG/1
2 AEROSOL, METERED RESPIRATORY (INHALATION) EVERY 4 HOURS PRN
Qty: 1 INHALER | Refills: 0 | Status: SHIPPED | OUTPATIENT
Start: 2020-03-10 | End: 2021-06-21

## 2020-03-10 NOTE — TELEPHONE ENCOUNTER
Action Requested: Summary for Provider     []  Critical Lab, Recommendations Needed  [] Need Additional Advice  []   FYI    []   Need Orders  [] Need Medications Sent to Pharmacy  []  Other     SUMMARY: Pt report having stomach flu over the weekend.  States

## 2020-03-10 NOTE — PROGRESS NOTES
HPI:    Patient ID: Toni Silva is a 34year old female. HPI   Patient presents for symptoms of shortness of breath and tightness in her chest.  Recently got over the stomach flu this weekend. Denies any fever during that timeframe.   Denies any Sitting, Cuff Size: large)   Pulse 72   Temp 97.9 °F (36.6 °C) (Oral)   Ht 5' 1\" (1.549 m)   Wt 141 lb 6.4 oz (64.1 kg)   LMP 02/29/2020 (Exact Date)   SpO2 97%   BMI 26.72 kg/m²   Body mass index is 26.72 kg/m².   Physical Exam    Constitutional: She is o

## 2020-03-12 ENCOUNTER — TELEPHONE (OUTPATIENT)
Dept: OTHER | Age: 30
End: 2020-03-12

## 2020-03-12 NOTE — TELEPHONE ENCOUNTER
Pt states that she has been using her inhaler as prescribed. Yesterday she had no problems, but today she has had some SOB and chest tightness. Pt was unable to get an appt here today, so she made an appt with Justyna richards in [place].   \"I'm just a

## 2020-05-18 ENCOUNTER — TELEPHONE (OUTPATIENT)
Dept: INTERNAL MEDICINE CLINIC | Facility: CLINIC | Age: 30
End: 2020-05-18

## 2020-05-18 ENCOUNTER — PATIENT MESSAGE (OUTPATIENT)
Dept: INTERNAL MEDICINE CLINIC | Facility: CLINIC | Age: 30
End: 2020-05-18

## 2020-05-18 NOTE — TELEPHONE ENCOUNTER
----- Message from Maria Esther Isabel RN sent at 5/18/2020  3:44 PM CDT -----  Regarding: FW: Prescription Question  Contact: [phone]      ----- Message -----  From: Ascencion Rockwell  Sent: 5/18/2020  10:45 AM CDT  To: [facility] Triage  Subject: Prescriptio

## 2020-05-18 NOTE — TELEPHONE ENCOUNTER
From: Burnard Salvage  To: Lacie Gorman MD  Sent: 5/18/2020 10:45 AM CDT  Subject: Prescription Question    Good morning, Dr Alessia Mendoza.   My anxiety has been very bad during this situation and the twice a day pill hasn't been helping me that much.  What

## 2020-05-19 PROBLEM — F41.9 ANXIETY: Status: ACTIVE | Noted: 2020-05-19

## 2020-05-19 NOTE — TELEPHONE ENCOUNTER
Patient had stopped her buspirone for a while; She is seeking a refill of the alprazalam that she was taking on an as needed basis. She has to physcially go in to school next week and her anxiety is becoming an issue again.     Video visit scheduled with

## 2020-05-19 NOTE — PROGRESS NOTES
Patient ID: Stacey Hassan is a 34year old female. No chief complaint on file. HISTORY OF PRESENT ILLNESS:   Patient presents for above. This visit is conducted using Telemedicine with live, interactive video and audio.   C/c anxiety   C/o h Socioeconomic History      Marital status: Single      Spouse name: Not on file      Number of children: 0      Years of education: Not on file      Highest education level: Not on file    Occupational History      Occupation:  EXAM:   Pt is A+o x 3 , no acute distress, pleasant   Pt speaking in full sentences , no conversational dyspnea or respiratory distress      ASSESSMENT/PLAN:   1. Anxiety  - ALPRAZolam 0.25 MG Oral Tab;  Take 1 tablet (0.25 mg total) by mouth daily as neede consents and the risks discussed. Lastly, the patient confirmed that [place] in [place]. Included in this visit, time may have been spent reviewing labs, medications, radiology tests and decision making.  Appropriate medical decision-making and test

## 2020-05-29 ENCOUNTER — TELEPHONE (OUTPATIENT)
Dept: INTERNAL MEDICINE CLINIC | Facility: CLINIC | Age: 30
End: 2020-05-29

## 2020-05-29 NOTE — TELEPHONE ENCOUNTER
Routing to PCP - FYI  Due to increased volume of messages, Central Triage will reach out to patient, but it may not be until end of business or tomorrow. RN please call pt. Thanks      ----- Message from Sam Bah sent at 5/29/2020 12:09 PM

## 2020-05-29 NOTE — TELEPHONE ENCOUNTER
Patient spoke to [facility] and scheduled phone visit with Dr. Yuri Wasserman on 05/30/20    Is concerned about seeing blood on toilet paper yesterday. She denies lightheadedness or bleeding.      Advised if she becomes lightheaded, has ongoing bleeding or even faints, she

## 2020-05-29 NOTE — TELEPHONE ENCOUNTER
pls help set up TE/video visit   Also call back immediately if there is bright red blood per rectum continuously or go to ER

## 2020-05-29 NOTE — TELEPHONE ENCOUNTER
Patient calling back (verified name and DON), advised Dr Deana Yates note and verbalized understanding. Offered video visit and agrees, offered different provider but declines,only wants Dr Donis Astudillo appointment made . Advised ED for worsening bleeding. Ins

## 2020-05-30 ENCOUNTER — E-VISIT (OUTPATIENT)
Dept: FAMILY MEDICINE CLINIC | Facility: CLINIC | Age: 30
End: 2020-05-30

## 2020-05-30 DIAGNOSIS — Z02.9 ADMINISTRATIVE ENCOUNTER: Primary | ICD-10-CM

## 2020-05-30 NOTE — PATIENT INSTRUCTIONS
Treating Anxiety Disorders with Medicine  An anxiety disorder can make you feel nervous or apprehensive, even without a clear reason.  In people age 72 and older, generalized anxiety disorder is one of the most commonly diagnosed anxiety disorders. Many t Never use alcohol or other drugs with anti-anxiety medicines. This could result in loss of muscular control, sedation, coma, or death. Also, use only the amount of medicine prescribed for you.  If you think you may have taken too much, get emergency care ri · Addiction. If Saúl Chars never had a problem with drugs or alcohol, you may not have a problem with medicines used to treat anxiety disorders. But always discuss the medicines with your healthcare provider before taking them.  If you have a history of addicti

## 2020-05-30 NOTE — PROGRESS NOTES
Patient ID: Jenny Zhang is a 34year old female. Patient presents with: Anxiety  Blood In Stool         HISTORY OF PRESENT ILLNESS:   Patient presents for above. This visit is conducted using Telemedicine with live, interactive video and audio. mononucleosis 09/2009   • Overactive bladder        Past Surgical History:   Procedure Laterality Date   • WISDOM TEETH REMOVED  2010   • WISDOM TEETH REMOVED  2014         Current Outpatient Medications:   •  ALPRAZolam 0.25 MG Oral Tab, Take 1 tablet (0. partner: Not on file        Emotionally abused: Not on file        Physically abused: Not on file        Forced sexual activity: Not on file    Other Topics      Concerns:         Service: Not Asked        Blood Transfusions: Not Asked        Caffe care. Patient advised to go to ER or call 911 for worsening symptoms or acute distress.      Telehealth outside of [address] Verbal Consent   I conducted a telehealth visit with Benjamin Morris today, 05/30/20, which was completed using two-wa

## 2020-07-02 ENCOUNTER — TELEPHONE (OUTPATIENT)
Dept: INTERNAL MEDICINE CLINIC | Facility: CLINIC | Age: 30
End: 2020-07-02

## 2020-07-02 ENCOUNTER — PATIENT MESSAGE (OUTPATIENT)
Dept: INTERNAL MEDICINE CLINIC | Facility: CLINIC | Age: 30
End: 2020-07-02

## 2020-07-02 NOTE — TELEPHONE ENCOUNTER
The patient calling for STD testing done in 2018. Results given as requested. She also wanted a copy sent to her GYN. I gave her the phone number to the [facility] department for release.

## 2020-07-02 NOTE — TELEPHONE ENCOUNTER
From: Peggy Rogers  To:  Lillie Sorto MD  Sent: 7/2/2020 10:31 AM CDT  Subject: Test Results Question    I have a question about T PALLIDUM SCREENING CASCADE resulted on 8/17/18, 10:38 AM.    I believe I was test on the same date for HIV, but can't

## 2020-07-20 ENCOUNTER — LAB REQUISITION (OUTPATIENT)
Dept: LAB | Facility: HOSPITAL | Age: 30
End: 2020-07-20
Payer: COMMERCIAL

## 2020-07-20 DIAGNOSIS — Z11.3 ENCOUNTER FOR SCREENING FOR INFECTIONS WITH A PREDOMINANTLY SEXUAL MODE OF TRANSMISSION: ICD-10-CM

## 2020-07-20 LAB
HBV SURFACE AG SER-ACNC: <0.1 [IU]/L
HBV SURFACE AG SERPL QL IA: NONREACTIVE
HCV AB SERPL QL IA: NONREACTIVE

## 2020-07-20 PROCEDURE — 87389 HIV-1 AG W/HIV-1&-2 AB AG IA: CPT | Performed by: OBSTETRICS & GYNECOLOGY

## 2020-07-20 PROCEDURE — 87340 HEPATITIS B SURFACE AG IA: CPT | Performed by: OBSTETRICS & GYNECOLOGY

## 2020-07-20 PROCEDURE — 86780 TREPONEMA PALLIDUM: CPT | Performed by: OBSTETRICS & GYNECOLOGY

## 2020-07-20 PROCEDURE — 86803 HEPATITIS C AB TEST: CPT | Performed by: OBSTETRICS & GYNECOLOGY

## 2020-07-22 LAB — T PALLIDUM AB SER QL: NEGATIVE

## 2020-07-28 ENCOUNTER — PATIENT MESSAGE (OUTPATIENT)
Dept: INTERNAL MEDICINE CLINIC | Facility: CLINIC | Age: 30
End: 2020-07-28

## 2020-07-28 NOTE — TELEPHONE ENCOUNTER
From: Monica Spann  To: Brenda Sommers MD  Sent: 7/28/2020 2:01 PM CDT  Subject: Test Results Question    I have a question about T PALLIDUM SCREENING CASCADE resulted on 7/22/20, 3:51 PM.    What is this test measuring for?

## 2020-08-12 ENCOUNTER — TELEPHONE (OUTPATIENT)
Dept: INTERNAL MEDICINE CLINIC | Facility: CLINIC | Age: 30
End: 2020-08-12

## 2020-08-12 NOTE — PROGRESS NOTES
Telemedicine Visit    Virtual/Telephone Check-In    Alex Evans verbally consents to a Telephone Check-In service on 08/12/20.  Patient understands and accepts financial responsibility for any deductible, co-insurance and/or co-pays associated with oriented x3, no acute distress  Patient speaking complete sentences without any conversational dyspnea or respiratory distress, no coughing heard    Summary of topics discussed/ diagnosis:  1. Anxiety  - busPIRone HCl 5 MG Oral Tab;  Take 1.5 tab twice zack

## 2020-08-12 NOTE — TELEPHONE ENCOUNTER
Bee, pharmacist at [address] [place] [address] called to verify prescription for Alprazolam received today. Because there was not a phone number on script and it is a controlled substance, she needs to verify that it was faxed by the doctor. Please advise.

## 2020-08-31 ENCOUNTER — TELEPHONE (OUTPATIENT)
Dept: INTERNAL MEDICINE CLINIC | Facility: CLINIC | Age: 30
End: 2020-08-31

## 2020-08-31 ENCOUNTER — NURSE TRIAGE (OUTPATIENT)
Dept: INTERNAL MEDICINE CLINIC | Facility: CLINIC | Age: 30
End: 2020-08-31

## 2020-08-31 NOTE — TELEPHONE ENCOUNTER
Spoke with patient. She went to a private testing facility. She will be in self-quarantine awaiting test results.

## 2020-08-31 NOTE — TELEPHONE ENCOUNTER
Spoke with patient states she already went to get tested at [facility], will keep us updated on results.

## 2020-08-31 NOTE — TELEPHONE ENCOUNTER
Action Requested: Summary for Provider     []  Critical Lab, Recommendations Needed  [] Need Additional Advice  []   FYI    []   Need Orders  [] Need Medications Sent to Pharmacy  []  Other     SUMMARY:Pt asking for Testing site info, s/s sore throat,h/a,

## 2020-08-31 NOTE — TELEPHONE ENCOUNTER
----- Message from Sam Santiago sent at 8/31/2020  9:00 AM CDT -----  Regarding: Other  Contact: [phone]  I have a sore throat and headache, but no fever.   In order to return to work, I have to get a Covid test, but was curious where to go to

## 2020-09-09 ENCOUNTER — V-VISIT (OUTPATIENT)
Dept: BEHAVIORAL HEALTH | Age: 30
End: 2020-09-09

## 2020-09-09 DIAGNOSIS — F42.2 MIXED OBSESSIONAL THOUGHTS AND ACTS: Primary | ICD-10-CM

## 2020-09-09 DIAGNOSIS — F41.1 GAD (GENERALIZED ANXIETY DISORDER): ICD-10-CM

## 2020-09-09 DIAGNOSIS — F33.0 MAJOR DEPRESSIVE DISORDER, RECURRENT EPISODE, MILD (CMD): ICD-10-CM

## 2020-09-09 PROCEDURE — 90792 PSYCH DIAG EVAL W/MED SRVCS: CPT | Performed by: PSYCHIATRY & NEUROLOGY

## 2020-09-09 RX ORDER — FLUOXETINE HYDROCHLORIDE 20 MG/1
40 CAPSULE ORAL DAILY
Qty: 60 CAPSULE | Refills: 0 | Status: SHIPPED | OUTPATIENT
Start: 2020-09-09 | End: 2020-10-28 | Stop reason: SDUPTHER

## 2020-09-09 RX ORDER — FLUOXETINE 10 MG/1
10 CAPSULE ORAL DAILY
Qty: 7 CAPSULE | Refills: 0 | Status: SHIPPED | OUTPATIENT
Start: 2020-09-09 | End: 2021-03-25 | Stop reason: DRUGHIGH

## 2020-09-09 RX ORDER — HYDROXYZINE HYDROCHLORIDE 10 MG/1
10 TABLET, FILM COATED ORAL 3 TIMES DAILY PRN
Qty: 90 TABLET | Refills: 1 | Status: SHIPPED | OUTPATIENT
Start: 2020-09-09 | End: 2020-10-28 | Stop reason: SDUPTHER

## 2020-09-09 SDOH — HEALTH STABILITY: MENTAL HEALTH: HOW MANY STANDARD DRINKS CONTAINING ALCOHOL DO YOU HAVE ON A TYPICAL DAY?: 1 OR 2

## 2020-10-14 ENCOUNTER — IMMUNIZATION (OUTPATIENT)
Dept: INTERNAL MEDICINE CLINIC | Facility: CLINIC | Age: 30
End: 2020-10-14

## 2020-10-14 ENCOUNTER — MED REC SCAN ONLY (OUTPATIENT)
Dept: INTERNAL MEDICINE CLINIC | Facility: CLINIC | Age: 30
End: 2020-10-14

## 2020-10-14 DIAGNOSIS — Z23 NEED FOR VACCINATION: ICD-10-CM

## 2020-10-14 PROCEDURE — 90686 IIV4 VACC NO PRSV 0.5 ML IM: CPT | Performed by: INTERNAL MEDICINE

## 2020-10-14 PROCEDURE — 90471 IMMUNIZATION ADMIN: CPT | Performed by: INTERNAL MEDICINE

## 2020-10-28 ENCOUNTER — TELEPHONE (OUTPATIENT)
Dept: BEHAVIORAL HEALTH | Age: 30
End: 2020-10-28

## 2020-10-28 RX ORDER — HYDROXYZINE HYDROCHLORIDE 10 MG/1
10 TABLET, FILM COATED ORAL 3 TIMES DAILY PRN
Qty: 90 TABLET | Refills: 1 | Status: SHIPPED | OUTPATIENT
Start: 2020-10-28 | End: 2020-10-29 | Stop reason: SDUPTHER

## 2020-10-28 RX ORDER — HYDROXYZINE HYDROCHLORIDE 10 MG/1
10 TABLET, FILM COATED ORAL 3 TIMES DAILY PRN
Qty: 90 TABLET | Refills: 1 | Status: CANCELLED | OUTPATIENT
Start: 2020-10-28

## 2020-10-28 RX ORDER — FLUOXETINE HYDROCHLORIDE 20 MG/1
40 CAPSULE ORAL DAILY
Qty: 60 CAPSULE | Refills: 0 | Status: SHIPPED | OUTPATIENT
Start: 2020-10-28 | End: 2020-10-29 | Stop reason: SDUPTHER

## 2020-10-28 RX ORDER — FLUOXETINE HYDROCHLORIDE 20 MG/1
40 CAPSULE ORAL DAILY
Qty: 60 CAPSULE | Refills: 0 | Status: CANCELLED | OUTPATIENT
Start: 2020-10-28

## 2020-10-29 RX ORDER — HYDROXYZINE HYDROCHLORIDE 10 MG/1
10 TABLET, FILM COATED ORAL 3 TIMES DAILY PRN
Qty: 90 TABLET | Refills: 1 | Status: SHIPPED | OUTPATIENT
Start: 2020-10-29 | End: 2020-12-23 | Stop reason: SDUPTHER

## 2020-10-29 RX ORDER — FLUOXETINE HYDROCHLORIDE 20 MG/1
40 CAPSULE ORAL DAILY
Qty: 60 CAPSULE | Refills: 0 | Status: SHIPPED | OUTPATIENT
Start: 2020-10-29 | End: 2020-12-03

## 2020-11-09 ENCOUNTER — LAB REQUISITION (OUTPATIENT)
Dept: LAB | Facility: HOSPITAL | Age: 30
End: 2020-11-09
Payer: COMMERCIAL

## 2020-11-09 DIAGNOSIS — Z01.419 ENCOUNTER FOR GYNECOLOGICAL EXAMINATION (GENERAL) (ROUTINE) WITHOUT ABNORMAL FINDINGS: ICD-10-CM

## 2020-11-09 DIAGNOSIS — Z11.3 ENCOUNTER FOR SCREENING FOR INFECTIONS WITH A PREDOMINANTLY SEXUAL MODE OF TRANSMISSION: ICD-10-CM

## 2020-11-09 PROCEDURE — 87591 N.GONORRHOEAE DNA AMP PROB: CPT | Performed by: OBSTETRICS & GYNECOLOGY

## 2020-11-09 PROCEDURE — 88175 CYTOPATH C/V AUTO FLUID REDO: CPT | Performed by: OBSTETRICS & GYNECOLOGY

## 2020-11-09 PROCEDURE — 84702 CHORIONIC GONADOTROPIN TEST: CPT | Performed by: OBSTETRICS & GYNECOLOGY

## 2020-11-09 PROCEDURE — 86780 TREPONEMA PALLIDUM: CPT | Performed by: OBSTETRICS & GYNECOLOGY

## 2020-11-09 PROCEDURE — 86803 HEPATITIS C AB TEST: CPT | Performed by: OBSTETRICS & GYNECOLOGY

## 2020-11-09 PROCEDURE — 87389 HIV-1 AG W/HIV-1&-2 AB AG IA: CPT | Performed by: OBSTETRICS & GYNECOLOGY

## 2020-11-09 PROCEDURE — 87340 HEPATITIS B SURFACE AG IA: CPT | Performed by: OBSTETRICS & GYNECOLOGY

## 2020-11-09 PROCEDURE — 87491 CHLMYD TRACH DNA AMP PROBE: CPT | Performed by: OBSTETRICS & GYNECOLOGY

## 2020-11-16 ENCOUNTER — TELEMEDICINE (OUTPATIENT)
Dept: INTERNAL MEDICINE CLINIC | Facility: CLINIC | Age: 30
End: 2020-11-16

## 2020-11-16 ENCOUNTER — NURSE TRIAGE (OUTPATIENT)
Dept: INTERNAL MEDICINE CLINIC | Facility: CLINIC | Age: 30
End: 2020-11-16

## 2020-11-16 DIAGNOSIS — R50.9 FEVER, UNSPECIFIED FEVER CAUSE: ICD-10-CM

## 2020-11-16 DIAGNOSIS — J02.9 SORE THROAT: Primary | ICD-10-CM

## 2020-11-16 DIAGNOSIS — Z20.822 SUSPECTED COVID-19 VIRUS INFECTION: ICD-10-CM

## 2020-11-16 PROCEDURE — 99213 OFFICE O/P EST LOW 20 MIN: CPT | Performed by: PHYSICIAN ASSISTANT

## 2020-11-16 NOTE — TELEPHONE ENCOUNTER
Scheduled doximity valentin today 10 am A Brown. Advised video visit process, billing; patient verbalized understanding. Sore throat, headache, earaches. No known covid contact. 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 Patient at home now.     Reason fo

## 2020-11-16 NOTE — PROGRESS NOTES
Visit for Respiratory Illness - Potential COVID-19 Infection  Subjective    This visit is conducted using Telemedicine with live, interactive video and audio.     Patient has been referred to the [facility] website at [email or web address] to review the yearly

## 2020-11-17 ENCOUNTER — V-VISIT (OUTPATIENT)
Dept: BEHAVIORAL HEALTH | Age: 30
End: 2020-11-17

## 2020-11-17 ENCOUNTER — LAB ENCOUNTER (OUTPATIENT)
Dept: LAB | Facility: HOSPITAL | Age: 30
End: 2020-11-17
Attending: PHYSICIAN ASSISTANT
Payer: COMMERCIAL

## 2020-11-17 DIAGNOSIS — Z20.822 EXPOSURE TO COVID-19 VIRUS: Primary | ICD-10-CM

## 2020-11-17 DIAGNOSIS — F33.0 MAJOR DEPRESSIVE DISORDER, RECURRENT EPISODE, MILD (CMD): Primary | ICD-10-CM

## 2020-11-17 DIAGNOSIS — F41.1 GAD (GENERALIZED ANXIETY DISORDER): ICD-10-CM

## 2020-11-17 DIAGNOSIS — F42.2 MIXED OBSESSIONAL THOUGHTS AND ACTS: ICD-10-CM

## 2020-11-17 PROCEDURE — 99213 OFFICE O/P EST LOW 20 MIN: CPT | Performed by: PSYCHIATRY & NEUROLOGY

## 2020-12-03 RX ORDER — FLUOXETINE HYDROCHLORIDE 20 MG/1
CAPSULE ORAL
Qty: 60 CAPSULE | Refills: 0 | Status: SHIPPED | OUTPATIENT
Start: 2020-12-03 | End: 2021-02-01 | Stop reason: SDUPTHER

## 2020-12-03 RX ORDER — FLUOXETINE HYDROCHLORIDE 20 MG/1
CAPSULE ORAL
Qty: 60 CAPSULE | Refills: 0 | Status: CANCELLED | OUTPATIENT
Start: 2020-12-03

## 2020-12-15 ENCOUNTER — PATIENT MESSAGE (OUTPATIENT)
Dept: INTERNAL MEDICINE CLINIC | Facility: CLINIC | Age: 30
End: 2020-12-15

## 2020-12-15 ENCOUNTER — NURSE TRIAGE (OUTPATIENT)
Dept: INTERNAL MEDICINE CLINIC | Facility: CLINIC | Age: 30
End: 2020-12-15

## 2020-12-15 DIAGNOSIS — R39.9 UTI SYMPTOMS: Primary | ICD-10-CM

## 2020-12-15 NOTE — TELEPHONE ENCOUNTER
From: Ascencion Rockwell  To: Onnie Eisenmenger, MD  Sent: 12/15/2020 8:06 AM CST  Subject: Other    This morning, I woke up with some burning when I peed. And now I have a sense of urgency to pee as well.      I have a history of UTIs and believe that I have o

## 2020-12-15 NOTE — TELEPHONE ENCOUNTER
----- Message from Sam Shine sent at 12/15/2020  8:06 AM CST -----  Regarding: Other  Contact: [phone]  This morning, I woke up with some burning when I peed. And now I have a sense of urgency to pee as well.      I have a history of UTIs an

## 2020-12-16 RX ORDER — SULFAMETHOXAZOLE AND TRIMETHOPRIM 800; 160 MG/1; MG/1
1 TABLET ORAL 2 TIMES DAILY
Qty: 6 TABLET | Refills: 0 | Status: SHIPPED | OUTPATIENT
Start: 2020-12-16 | End: 2020-12-19

## 2020-12-16 NOTE — TELEPHONE ENCOUNTER
Action Requested: Summary for Provider     []  Critical Lab, Recommendations Needed  [x] Need Additional Advice  []   FYI    []   Need Orders  [x] Need Medications Sent to Pharmacy  []  Other     SUMMARY: pt asking if doctor would prescribe antibiotic for

## 2020-12-28 RX ORDER — HYDROXYZINE HYDROCHLORIDE 10 MG/1
10 TABLET, FILM COATED ORAL 3 TIMES DAILY PRN
Qty: 30 TABLET | Refills: 0 | Status: SHIPPED | OUTPATIENT
Start: 2020-12-28 | End: 2021-01-12 | Stop reason: SDUPTHER

## 2020-12-30 ENCOUNTER — TELEPHONE (OUTPATIENT)
Dept: INTERNAL MEDICINE CLINIC | Facility: CLINIC | Age: 30
End: 2020-12-30

## 2020-12-30 NOTE — TELEPHONE ENCOUNTER
Verified name and . Patient reports that she tested positive for COVID-19 on 20 (chart banner updated). She states symptoms of congested nose, ear discomfort, and cough started around 20.   She denies fever and difficulty breathing at this

## 2020-12-30 NOTE — TELEPHONE ENCOUNTER
Yes, please put her on the Covid callback list for home monitoring as she was very nervous about Covid  Please let her know that I did recommend this  ty

## 2020-12-31 NOTE — TELEPHONE ENCOUNTER
Left message to call back=first attempt      TRIAGE team will do home monitoring   PLEASE DO NOT CLOSE THIS ENCOUNTER.

## 2021-01-02 NOTE — TELEPHONE ENCOUNTER
Left message to call back     Triage team will monitor patient . Please DO NOT close this encounter.

## 2021-01-04 NOTE — TELEPHONE ENCOUNTER
Patient called back. Feels better. Explained guidelines below to be free of isolation.      At least 10 days have passed since symptoms first appeared and  At least 24 hours have passed since last fever without the use of fever-reducing medications and  Sym

## 2021-01-13 RX ORDER — HYDROXYZINE HYDROCHLORIDE 10 MG/1
10 TABLET, FILM COATED ORAL 3 TIMES DAILY PRN
Qty: 30 TABLET | Refills: 0 | Status: SHIPPED | OUTPATIENT
Start: 2021-01-13 | End: 2021-02-01 | Stop reason: SDUPTHER

## 2021-02-02 RX ORDER — FLUOXETINE HYDROCHLORIDE 20 MG/1
40 CAPSULE ORAL DAILY
Qty: 60 CAPSULE | Refills: 0 | Status: SHIPPED | OUTPATIENT
Start: 2021-02-02 | End: 2021-03-03 | Stop reason: SDUPTHER

## 2021-02-02 RX ORDER — HYDROXYZINE HYDROCHLORIDE 10 MG/1
10 TABLET, FILM COATED ORAL 3 TIMES DAILY PRN
Qty: 30 TABLET | Refills: 0 | Status: SHIPPED | OUTPATIENT
Start: 2021-02-02 | End: 2021-02-17 | Stop reason: SDUPTHER

## 2021-02-17 RX ORDER — HYDROXYZINE HYDROCHLORIDE 10 MG/1
10 TABLET, FILM COATED ORAL 3 TIMES DAILY PRN
Qty: 30 TABLET | Refills: 0 | Status: SHIPPED | OUTPATIENT
Start: 2021-02-17 | End: 2021-02-22 | Stop reason: SDUPTHER

## 2021-02-18 ENCOUNTER — TELEPHONE (OUTPATIENT)
Dept: BEHAVIORAL HEALTH | Age: 31
End: 2021-02-18

## 2021-02-22 RX ORDER — HYDROXYZINE HYDROCHLORIDE 10 MG/1
10 TABLET, FILM COATED ORAL 3 TIMES DAILY
Qty: 90 TABLET | Refills: 0 | Status: SHIPPED | OUTPATIENT
Start: 2021-02-22 | End: 2021-03-25 | Stop reason: SDUPTHER

## 2021-02-23 ENCOUNTER — APPOINTMENT (OUTPATIENT)
Dept: BEHAVIORAL HEALTH | Age: 31
End: 2021-02-23

## 2021-03-03 RX ORDER — FLUOXETINE HYDROCHLORIDE 20 MG/1
40 CAPSULE ORAL DAILY
Qty: 60 CAPSULE | Refills: 0 | Status: SHIPPED | OUTPATIENT
Start: 2021-03-03 | End: 2021-03-25 | Stop reason: SDUPTHER

## 2021-03-09 DIAGNOSIS — F41.9 ANXIETY: ICD-10-CM

## 2021-03-09 RX ORDER — ALPRAZOLAM 0.25 MG/1
0.25 TABLET ORAL DAILY PRN
Qty: 30 TABLET | Refills: 0 | Status: SHIPPED | OUTPATIENT
Start: 2021-03-09 | End: 2021-05-28

## 2021-03-19 ENCOUNTER — IMMUNIZATION (OUTPATIENT)
Dept: LAB | Age: 31
End: 2021-03-19
Attending: HOSPITALIST
Payer: COMMERCIAL

## 2021-03-19 ENCOUNTER — PATIENT MESSAGE (OUTPATIENT)
Dept: INTERNAL MEDICINE CLINIC | Facility: CLINIC | Age: 31
End: 2021-03-19

## 2021-03-19 DIAGNOSIS — Z23 NEED FOR VACCINATION: Primary | ICD-10-CM

## 2021-03-19 PROCEDURE — 0001A SARSCOV2 VAC 30MCG/0.3ML IM: CPT

## 2021-03-20 NOTE — TELEPHONE ENCOUNTER
Routed to Dr Kody Montez  for advise, thanks.       Future Appointments   Date Time Provider Mitch Palencia   4/9/2021 11:50 AM [facility]

## 2021-03-20 NOTE — TELEPHONE ENCOUNTER
From: Shannon Ryder  To: Iram Moya MD  Sent: 3/19/2021 8:12 PM CDT  Subject: Prescription Question    Good evening,   Today I received my first dosage of the Covid vaccine. Katina Gutiérrez. I got Pfizer.  And I was wondering if either of the medicines (Hydrox

## 2021-03-25 ENCOUNTER — V-VISIT (OUTPATIENT)
Dept: BEHAVIORAL HEALTH | Age: 31
End: 2021-03-25

## 2021-03-25 ENCOUNTER — TELEPHONE (OUTPATIENT)
Dept: BEHAVIORAL HEALTH | Age: 31
End: 2021-03-25

## 2021-03-25 DIAGNOSIS — F33.0 MAJOR DEPRESSIVE DISORDER, RECURRENT EPISODE, MILD (CMD): Primary | ICD-10-CM

## 2021-03-25 DIAGNOSIS — F42.2 MIXED OBSESSIONAL THOUGHTS AND ACTS: ICD-10-CM

## 2021-03-25 DIAGNOSIS — F41.1 GAD (GENERALIZED ANXIETY DISORDER): ICD-10-CM

## 2021-03-25 PROCEDURE — 99212 OFFICE O/P EST SF 10 MIN: CPT | Performed by: PSYCHIATRY & NEUROLOGY

## 2021-03-25 RX ORDER — FLUOXETINE HYDROCHLORIDE 20 MG/1
CAPSULE ORAL
Qty: 90 CAPSULE | Refills: 2 | Status: SHIPPED | OUTPATIENT
Start: 2021-03-25 | End: 2021-05-19 | Stop reason: SDUPTHER

## 2021-03-25 RX ORDER — HYDROXYZINE HYDROCHLORIDE 10 MG/1
10 TABLET, FILM COATED ORAL 2 TIMES DAILY
Qty: 60 TABLET | Refills: 2 | Status: SHIPPED | OUTPATIENT
Start: 2021-03-25 | End: 2021-05-19 | Stop reason: SDUPTHER

## 2021-04-09 ENCOUNTER — IMMUNIZATION (OUTPATIENT)
Dept: LAB | Age: 31
End: 2021-04-09
Attending: HOSPITALIST
Payer: COMMERCIAL

## 2021-04-09 DIAGNOSIS — Z23 NEED FOR VACCINATION: Primary | ICD-10-CM

## 2021-04-09 PROCEDURE — 0002A SARSCOV2 VAC 30MCG/0.3ML IM: CPT

## 2021-05-20 RX ORDER — HYDROXYZINE HYDROCHLORIDE 10 MG/1
10 TABLET, FILM COATED ORAL 2 TIMES DAILY
Qty: 60 TABLET | Refills: 2 | Status: SHIPPED | OUTPATIENT
Start: 2021-05-20 | End: 2021-08-16

## 2021-05-20 RX ORDER — FLUOXETINE HYDROCHLORIDE 20 MG/1
CAPSULE ORAL
Qty: 90 CAPSULE | Refills: 2 | Status: SHIPPED | OUTPATIENT
Start: 2021-05-20 | End: 2021-08-16

## 2021-05-28 DIAGNOSIS — F41.9 ANXIETY: ICD-10-CM

## 2021-05-28 RX ORDER — ALPRAZOLAM 0.25 MG/1
0.25 TABLET ORAL DAILY PRN
Qty: 30 TABLET | Refills: 0 | Status: SHIPPED | OUTPATIENT
Start: 2021-05-28 | End: 2021-09-19

## 2021-06-03 ENCOUNTER — LAB REQUISITION (OUTPATIENT)
Dept: LAB | Facility: HOSPITAL | Age: 31
End: 2021-06-03
Payer: COMMERCIAL

## 2021-06-03 DIAGNOSIS — Z11.3 ENCOUNTER FOR SCREENING FOR INFECTIONS WITH A PREDOMINANTLY SEXUAL MODE OF TRANSMISSION: ICD-10-CM

## 2021-06-03 DIAGNOSIS — Z01.419 ENCOUNTER FOR GYNECOLOGICAL EXAMINATION (GENERAL) (ROUTINE) WITHOUT ABNORMAL FINDINGS: ICD-10-CM

## 2021-06-03 DIAGNOSIS — N76.0 ACUTE VAGINITIS: ICD-10-CM

## 2021-06-03 PROCEDURE — 86780 TREPONEMA PALLIDUM: CPT | Performed by: OBSTETRICS & GYNECOLOGY

## 2021-06-03 PROCEDURE — 86803 HEPATITIS C AB TEST: CPT | Performed by: OBSTETRICS & GYNECOLOGY

## 2021-06-03 PROCEDURE — 87491 CHLMYD TRACH DNA AMP PROBE: CPT | Performed by: OBSTETRICS & GYNECOLOGY

## 2021-06-03 PROCEDURE — 87808 TRICHOMONAS ASSAY W/OPTIC: CPT | Performed by: OBSTETRICS & GYNECOLOGY

## 2021-06-03 PROCEDURE — 87205 SMEAR GRAM STAIN: CPT | Performed by: OBSTETRICS & GYNECOLOGY

## 2021-06-03 PROCEDURE — 88175 CYTOPATH C/V AUTO FLUID REDO: CPT | Performed by: OBSTETRICS & GYNECOLOGY

## 2021-06-03 PROCEDURE — 84702 CHORIONIC GONADOTROPIN TEST: CPT | Performed by: OBSTETRICS & GYNECOLOGY

## 2021-06-03 PROCEDURE — 87106 FUNGI IDENTIFICATION YEAST: CPT | Performed by: OBSTETRICS & GYNECOLOGY

## 2021-06-03 PROCEDURE — 87340 HEPATITIS B SURFACE AG IA: CPT | Performed by: OBSTETRICS & GYNECOLOGY

## 2021-06-03 PROCEDURE — 87591 N.GONORRHOEAE DNA AMP PROB: CPT | Performed by: OBSTETRICS & GYNECOLOGY

## 2021-06-03 PROCEDURE — 87389 HIV-1 AG W/HIV-1&-2 AB AG IA: CPT | Performed by: OBSTETRICS & GYNECOLOGY

## 2021-06-16 ENCOUNTER — PATIENT MESSAGE (OUTPATIENT)
Dept: INTERNAL MEDICINE CLINIC | Facility: CLINIC | Age: 31
End: 2021-06-16

## 2021-06-16 NOTE — TELEPHONE ENCOUNTER
From: Gulshan Lomax  To: Paola Jones MD  Sent: 6/16/2021 3:03 PM CDT  Subject: Other    Good afternoon,   I recently accepted a job at [employer]. And one of the papers that I need to have filled out is a statement of good health.  I

## 2021-06-17 ENCOUNTER — APPOINTMENT (OUTPATIENT)
Dept: BEHAVIORAL HEALTH | Age: 31
End: 2021-06-17

## 2021-06-17 ENCOUNTER — MED REC SCAN ONLY (OUTPATIENT)
Dept: INTERNAL MEDICINE CLINIC | Facility: CLINIC | Age: 31
End: 2021-06-17

## 2021-06-17 ENCOUNTER — PATIENT MESSAGE (OUTPATIENT)
Dept: INTERNAL MEDICINE CLINIC | Facility: CLINIC | Age: 31
End: 2021-06-17

## 2021-06-17 ENCOUNTER — V-VISIT (OUTPATIENT)
Dept: BEHAVIORAL HEALTH | Age: 31
End: 2021-06-17

## 2021-06-17 DIAGNOSIS — F42.2 MIXED OBSESSIONAL THOUGHTS AND ACTS: ICD-10-CM

## 2021-06-17 DIAGNOSIS — F41.1 GAD (GENERALIZED ANXIETY DISORDER): ICD-10-CM

## 2021-06-17 DIAGNOSIS — F33.0 MAJOR DEPRESSIVE DISORDER, RECURRENT EPISODE, MILD (CMD): Primary | ICD-10-CM

## 2021-06-17 PROCEDURE — 99212 OFFICE O/P EST SF 10 MIN: CPT | Performed by: PSYCHIATRY & NEUROLOGY

## 2021-06-17 NOTE — TELEPHONE ENCOUNTER
pls see other msg -- CMA was not able to fax and pts number wasn't working   Form filled but the date that was put was when she had her physical which was in 2019- not sure if they will take tat - she may need a new physical

## 2021-06-17 NOTE — TELEPHONE ENCOUNTER
To reception staff, pls call pt for appt. Also Owlint message sent to pt . Routed to Dr James Rasmussen for advise, thanks.

## 2021-06-17 NOTE — TELEPHONE ENCOUNTER
Printed and filled out form–please scan and please let patient know  Last telemedicine visit 8/2019 and LOV 2019   Last physical was 12/2019

## 2021-06-17 NOTE — TELEPHONE ENCOUNTER
From: Sary Moore  To: Vida Rosen MD  Sent: 6/17/2021 4:34 PM CDT  Subject: Other    Yesterday, I sent a statement of good health form. I was wondering if a copy of the completed form could be sent via [employer]?

## 2021-06-18 ENCOUNTER — TELEPHONE (OUTPATIENT)
Dept: INTERNAL MEDICINE CLINIC | Facility: CLINIC | Age: 31
End: 2021-06-18

## 2021-06-18 NOTE — TELEPHONE ENCOUNTER
Pt would like to schedule her physical with the doctor. Pt will not have insurance after 6-30-21 and would like to be seen before that date. There are no available appointments. Would, the doctor like to add the patient to her schedule?

## 2021-06-21 ENCOUNTER — OFFICE VISIT (OUTPATIENT)
Dept: INTERNAL MEDICINE CLINIC | Facility: CLINIC | Age: 31
End: 2021-06-21

## 2021-06-21 VITALS
DIASTOLIC BLOOD PRESSURE: 72 MMHG | BODY MASS INDEX: 27.88 KG/M2 | HEART RATE: 73 BPM | WEIGHT: 142 LBS | HEIGHT: 60 IN | SYSTOLIC BLOOD PRESSURE: 107 MMHG | RESPIRATION RATE: 16 BRPM

## 2021-06-21 DIAGNOSIS — K82.4 GALLBLADDER POLYP: ICD-10-CM

## 2021-06-21 DIAGNOSIS — Z80.3 FH: BREAST CANCER IN FIRST DEGREE RELATIVE WHEN <50 YEARS OLD: ICD-10-CM

## 2021-06-21 DIAGNOSIS — Z00.00 PHYSICAL EXAM, ANNUAL: Primary | ICD-10-CM

## 2021-06-21 PROCEDURE — 99395 PREV VISIT EST AGE 18-39: CPT | Performed by: INTERNAL MEDICINE

## 2021-06-21 PROCEDURE — 3074F SYST BP LT 130 MM HG: CPT | Performed by: INTERNAL MEDICINE

## 2021-06-21 PROCEDURE — 3078F DIAST BP <80 MM HG: CPT | Performed by: INTERNAL MEDICINE

## 2021-06-21 PROCEDURE — 3008F BODY MASS INDEX DOCD: CPT | Performed by: INTERNAL MEDICINE

## 2021-06-21 RX ORDER — FLUOXETINE HYDROCHLORIDE 20 MG/1
20 CAPSULE ORAL DAILY
COMMUNITY

## 2021-06-21 RX ORDER — HYDROXYZINE HYDROCHLORIDE 10 MG/1
10 TABLET, FILM COATED ORAL
COMMUNITY

## 2021-06-21 NOTE — TELEPHONE ENCOUNTER
Spoke with pt,  verified  Pt informed of MD recommendation, pt stated she has physical scheduled today and she will bring a new form.        Future Appointments   Date Time Provider Mitch Palencia   2021  3:30 PM Good Edwards MD [facility] PANCHO Rollins

## 2021-06-21 NOTE — PROGRESS NOTES
Rose Marie Vargas is a 31year old female.   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                History   C/o anxiety -sees a therapist and wo hearing ear pain nasal congestion or sore throat  SKIN: denies any unusual skin lesions or rashes  RESPIRATORY: denies shortness of breath, cough, wheezing  CARDIOVASCULAR: denies chest pain on exertion, palpitations, swelling in feet  GI: denies abdominal also had some type of cancer although she is not sure what kind           Preventative medicine   Pap- 2/19 normal and 6/2021 dr Neno Lopez at [facility]  Labs to be done once fasting       The patient indicates understanding of these issues and agrees to the plan.   Aristeo Caruso

## 2021-06-25 ENCOUNTER — TELEPHONE (OUTPATIENT)
Dept: GENETICS | Facility: HOSPITAL | Age: 31
End: 2021-06-25

## 2021-08-16 RX ORDER — FLUOXETINE HYDROCHLORIDE 20 MG/1
CAPSULE ORAL
Qty: 90 CAPSULE | Refills: 0 | Status: SHIPPED | OUTPATIENT
Start: 2021-08-16 | End: 2021-10-25

## 2021-08-16 RX ORDER — HYDROXYZINE HYDROCHLORIDE 10 MG/1
TABLET, FILM COATED ORAL
Qty: 60 TABLET | Refills: 0 | Status: SHIPPED | OUTPATIENT
Start: 2021-08-16 | End: 2021-09-10 | Stop reason: SDUPTHER

## 2021-09-09 NOTE — LETTER
3/11/2019          To Whom It May Concern:    Ascencion Rockwell is currently under my medical care and may not return to work at this time. Please excuse Jose Enrique Delgado for 2 days. She may return to work on 3/13/19.   Activity is restricted as follows: non I can give him referral for his current problems of course we cannot  Back date  And I do not know what was the diagnosis at that time   He has back pain and bilateral leg weakness , if this is needed I will send the referral

## 2021-09-10 ENCOUNTER — TELEPHONE (OUTPATIENT)
Dept: BEHAVIORAL HEALTH | Age: 31
End: 2021-09-10

## 2021-09-10 RX ORDER — HYDROXYZINE HYDROCHLORIDE 10 MG/1
10 TABLET, FILM COATED ORAL 2 TIMES DAILY
Qty: 180 TABLET | Refills: 0 | Status: SHIPPED | OUTPATIENT
Start: 2021-09-10 | End: 2021-11-10 | Stop reason: SDUPTHER

## 2021-09-15 ENCOUNTER — APPOINTMENT (OUTPATIENT)
Dept: BEHAVIORAL HEALTH | Age: 31
End: 2021-09-15

## 2021-09-16 ENCOUNTER — TELEPHONE (OUTPATIENT)
Dept: BEHAVIORAL HEALTH | Age: 31
End: 2021-09-16

## 2021-09-18 DIAGNOSIS — F41.9 ANXIETY: ICD-10-CM

## 2021-09-19 RX ORDER — ALPRAZOLAM 0.25 MG/1
TABLET ORAL
Qty: 30 TABLET | Refills: 0 | Status: SHIPPED | OUTPATIENT
Start: 2021-09-19 | End: 2021-11-06

## 2021-09-20 RX ORDER — HYDROXYZINE HYDROCHLORIDE 10 MG/1
TABLET, FILM COATED ORAL
Qty: 180 TABLET | Refills: 0 | OUTPATIENT
Start: 2021-09-20

## 2021-10-25 RX ORDER — FLUOXETINE HYDROCHLORIDE 20 MG/1
CAPSULE ORAL
Qty: 90 CAPSULE | Refills: 0 | Status: SHIPPED | OUTPATIENT
Start: 2021-10-25 | End: 2021-11-10 | Stop reason: SDUPTHER

## 2021-11-04 DIAGNOSIS — F41.9 ANXIETY: ICD-10-CM

## 2021-11-06 RX ORDER — ALPRAZOLAM 0.25 MG/1
TABLET ORAL
Qty: 30 TABLET | Refills: 0 | Status: SHIPPED | OUTPATIENT
Start: 2021-11-06 | End: 2022-01-25

## 2021-11-08 ENCOUNTER — TELEPHONE (OUTPATIENT)
Dept: BEHAVIORAL HEALTH | Age: 31
End: 2021-11-08

## 2021-11-09 ENCOUNTER — TELEPHONE (OUTPATIENT)
Dept: BEHAVIORAL HEALTH | Age: 31
End: 2021-11-09

## 2021-11-10 ENCOUNTER — TELEPHONE (OUTPATIENT)
Dept: INTERNAL MEDICINE CLINIC | Facility: CLINIC | Age: 31
End: 2021-11-10

## 2021-11-10 ENCOUNTER — BEHAVIORAL HEALTH (OUTPATIENT)
Dept: BEHAVIORAL HEALTH | Age: 31
End: 2021-11-10

## 2021-11-10 DIAGNOSIS — F41.1 GAD (GENERALIZED ANXIETY DISORDER): ICD-10-CM

## 2021-11-10 DIAGNOSIS — F33.0 MAJOR DEPRESSIVE DISORDER, RECURRENT EPISODE, MILD (CMD): Primary | ICD-10-CM

## 2021-11-10 DIAGNOSIS — F42.2 MIXED OBSESSIONAL THOUGHTS AND ACTS: ICD-10-CM

## 2021-11-10 PROCEDURE — 99212 OFFICE O/P EST SF 10 MIN: CPT | Performed by: PSYCHIATRY & NEUROLOGY

## 2021-11-10 RX ORDER — PROPRANOLOL HYDROCHLORIDE 10 MG/1
10 TABLET ORAL 2 TIMES DAILY
Qty: 60 TABLET | Refills: 2 | Status: SHIPPED | OUTPATIENT
Start: 2021-11-10 | End: 2022-03-02 | Stop reason: CLARIF

## 2021-11-10 RX ORDER — HYDROXYZINE HYDROCHLORIDE 10 MG/1
10 TABLET, FILM COATED ORAL 2 TIMES DAILY
Qty: 180 TABLET | Refills: 0 | Status: SHIPPED | OUTPATIENT
Start: 2021-11-10 | End: 2022-03-23

## 2021-11-10 RX ORDER — FLUOXETINE HYDROCHLORIDE 20 MG/1
CAPSULE ORAL
Qty: 90 CAPSULE | Refills: 2 | Status: SHIPPED | OUTPATIENT
Start: 2021-11-10 | End: 2022-01-25

## 2021-11-11 NOTE — TELEPHONE ENCOUNTER
----- Message from Sam Gonzalez sent at 11/10/2021  2:53 PM CST -----  Regarding: Sick Question   Good afternoon. This morning I woke up at 2 AM with some really bad heartburn and I took some Tums.  About an hour and a half later I threw up the Tums

## 2021-11-11 NOTE — TELEPHONE ENCOUNTER
See [employer] message  Left detailed message to call if need further assist  Left message to call back.   2nd attempt  Pt did not read [employer] message to call

## 2021-11-12 NOTE — TELEPHONE ENCOUNTER
Spoke with patient relayed PCP message below. Patient verbalized understanding and states she is better now; no fevers, no diarrhea, no vomiting. Advised to call back if s/sx return.

## 2021-11-12 NOTE — TELEPHONE ENCOUNTER
Monitor for fevers , sob , losss of taste of smell  Stay well hydrated and eat a bland brat diet  ER UC if not better , avoid dehydration

## 2021-12-27 ENCOUNTER — IMMUNIZATION (OUTPATIENT)
Dept: LAB | Facility: HOSPITAL | Age: 31
End: 2021-12-27
Attending: EMERGENCY MEDICINE
Payer: COMMERCIAL

## 2021-12-27 DIAGNOSIS — Z23 NEED FOR VACCINATION: Primary | ICD-10-CM

## 2021-12-27 PROCEDURE — 0064A SARSCOV2 VAC 50MCG/0.25ML IM: CPT

## 2022-01-25 DIAGNOSIS — F41.9 ANXIETY: ICD-10-CM

## 2022-01-25 RX ORDER — FLUOXETINE HYDROCHLORIDE 20 MG/1
CAPSULE ORAL
Qty: 90 CAPSULE | Refills: 2 | Status: SHIPPED | OUTPATIENT
Start: 2022-01-25 | End: 2022-03-28

## 2022-01-25 RX ORDER — ALPRAZOLAM 0.25 MG/1
0.25 TABLET ORAL DAILY PRN
Qty: 30 TABLET | Refills: 0 | Status: SHIPPED | OUTPATIENT
Start: 2022-01-25

## 2022-01-25 NOTE — TELEPHONE ENCOUNTER
Please review refill protocol failed/ no protocol  Requested Prescriptions   Pending Prescriptions Disp Refills    ALPRAZOLAM 0.25 MG Oral Tab [Pharmacy Med Name: ALPRAZOLAM 0.25MG TABLETS] 30 tablet 0     Sig: TAKE 1 TABLET(0.25 MG) BY MOUTH DAILY AS NEED

## 2022-02-21 ENCOUNTER — HOSPITAL ENCOUNTER (OUTPATIENT)
Dept: ULTRASOUND IMAGING | Age: 32
Discharge: HOME | End: 2022-02-21
Attending: INTERNAL MEDICINE
Payer: COMMERCIAL

## 2022-02-21 DIAGNOSIS — K82.4 GALLBLADDER POLYP: ICD-10-CM

## 2022-02-21 PROCEDURE — 76700 US EXAM ABDOM COMPLETE: CPT | Performed by: INTERNAL MEDICINE

## 2022-02-28 RX ORDER — FLUOXETINE HYDROCHLORIDE 20 MG/1
CAPSULE ORAL
Qty: 60 CAPSULE | OUTPATIENT
Start: 2022-02-28

## 2022-03-02 ENCOUNTER — BEHAVIORAL HEALTH (OUTPATIENT)
Dept: BEHAVIORAL HEALTH | Age: 32
End: 2022-03-02

## 2022-03-02 DIAGNOSIS — F41.1 GAD (GENERALIZED ANXIETY DISORDER): ICD-10-CM

## 2022-03-02 DIAGNOSIS — F42.2 MIXED OBSESSIONAL THOUGHTS AND ACTS: ICD-10-CM

## 2022-03-02 DIAGNOSIS — F33.0 MAJOR DEPRESSIVE DISORDER, RECURRENT EPISODE, MILD (CMD): Primary | ICD-10-CM

## 2022-03-02 PROCEDURE — 99212 OFFICE O/P EST SF 10 MIN: CPT | Performed by: PSYCHIATRY & NEUROLOGY

## 2022-03-23 RX ORDER — HYDROXYZINE HYDROCHLORIDE 10 MG/1
TABLET, FILM COATED ORAL
Qty: 60 TABLET | Refills: 1 | Status: SHIPPED | OUTPATIENT
Start: 2022-03-23 | End: 2022-05-24

## 2022-03-28 RX ORDER — FLUOXETINE HYDROCHLORIDE 20 MG/1
CAPSULE ORAL
Qty: 90 CAPSULE | Refills: 2 | Status: SHIPPED | OUTPATIENT
Start: 2022-03-28 | End: 2022-06-27 | Stop reason: SDUPTHER

## 2022-04-06 RX ORDER — ALPRAZOLAM 0.25 MG/1
0.25 TABLET ORAL DAILY PRN
Qty: 30 TABLET | Refills: 5 | Status: SHIPPED | OUTPATIENT
Start: 2022-04-06

## 2022-04-07 NOTE — TELEPHONE ENCOUNTER
Please review refill failed/no protocol     Requested Prescriptions     Pending Prescriptions Disp Refills    ALPRAZOLAM 0.25 MG Oral Tab [Pharmacy Med Name: ALPRAZOLAM 0.25MG TABLETS] 30 tablet 0     Sig: TAKE 1 TABLET(0.25 MG) BY MOUTH DAILY AS NEEDED FOR ANXIETY         Recent Visits  Date Type Provider Dept   06/21/21 Office Visit Quique Wise MD [facility]   Showing recent visits within past 540 days with a meds authorizing provider and meeting all other requirements  Future Appointments  No visits were found meeting these conditions.   Showing future appointments within next 150 days with a meds authorizing provider and meeting all other requirements    Requested Prescriptions   Pending Prescriptions Disp Refills    ALPRAZOLAM 0.25 MG Oral Tab [Pharmacy Med Name: ALPRAZOLAM 0.25MG TABLETS] 30 tablet 0     Sig: TAKE 1 TABLET(0.25 MG) BY MOUTH DAILY AS NEEDED FOR ANXIETY        There is no refill protocol information for this order           Recent Outpatient Visits              9 months ago Physical exam, annual    Kayode Mcclain MD    Office Visit    1 year ago Sore throat    [facility], [address] PA-C    Telemedicine    1 year ago [employer], [address] Arloa Berger, MD    Telemedicine    1 year ago Administrative encounter    [facility] Mariajose Frances APRN    E-Visit    1 year ago Ana England, Ama Orellana MD    Telemedicine

## 2022-04-25 ENCOUNTER — TELEMEDICINE (OUTPATIENT)
Dept: INTERNAL MEDICINE CLINIC | Facility: CLINIC | Age: 32
End: 2022-04-25
Payer: COMMERCIAL

## 2022-04-25 DIAGNOSIS — R43.0 POST-COVID CHRONIC LOSS OF SMELL: ICD-10-CM

## 2022-04-25 DIAGNOSIS — R51.9 CHRONIC NONINTRACTABLE HEADACHE, UNSPECIFIED HEADACHE TYPE: Primary | ICD-10-CM

## 2022-04-25 DIAGNOSIS — U09.9 POST-COVID CHRONIC LOSS OF TASTE: ICD-10-CM

## 2022-04-25 DIAGNOSIS — R43.2 POST-COVID CHRONIC LOSS OF TASTE: ICD-10-CM

## 2022-04-25 DIAGNOSIS — U09.9 POST-COVID CHRONIC LOSS OF SMELL: ICD-10-CM

## 2022-04-25 DIAGNOSIS — G89.29 CHRONIC NONINTRACTABLE HEADACHE, UNSPECIFIED HEADACHE TYPE: Primary | ICD-10-CM

## 2022-04-25 RX ORDER — TOPIRAMATE 25 MG/1
25 TABLET ORAL DAILY
Qty: 30 TABLET | Refills: 3 | Status: SHIPPED | OUTPATIENT
Start: 2022-04-25 | End: 2023-04-20

## 2022-04-27 ENCOUNTER — OFFICE VISIT (OUTPATIENT)
Dept: SURGERY | Facility: CLINIC | Age: 32
End: 2022-04-27
Payer: COMMERCIAL

## 2022-04-27 VITALS — WEIGHT: 140 LBS | HEIGHT: 60 IN | BODY MASS INDEX: 27.48 KG/M2

## 2022-04-27 DIAGNOSIS — R10.84 GENERALIZED ABDOMINAL PAIN: ICD-10-CM

## 2022-04-27 DIAGNOSIS — R11.0 NAUSEA: ICD-10-CM

## 2022-04-27 DIAGNOSIS — K82.4 GALLBLADDER POLYP: Primary | ICD-10-CM

## 2022-04-27 PROCEDURE — 99214 OFFICE O/P EST MOD 30 MIN: CPT | Performed by: SURGERY

## 2022-04-27 PROCEDURE — 3008F BODY MASS INDEX DOCD: CPT | Performed by: SURGERY

## 2022-05-24 ENCOUNTER — TELEPHONE (OUTPATIENT)
Dept: BEHAVIORAL HEALTH | Age: 32
End: 2022-05-24

## 2022-05-24 RX ORDER — HYDROXYZINE HYDROCHLORIDE 10 MG/1
TABLET, FILM COATED ORAL
Qty: 60 TABLET | Refills: 1 | Status: SHIPPED | OUTPATIENT
Start: 2022-05-24 | End: 2022-07-22 | Stop reason: SDUPTHER

## 2022-05-30 ENCOUNTER — TELEPHONE (OUTPATIENT)
Dept: INTERNAL MEDICINE CLINIC | Facility: CLINIC | Age: 32
End: 2022-05-30

## 2022-05-31 NOTE — TELEPHONE ENCOUNTER
Patient reports ongoing digestion issues, had green stools few days. No abdominal pain other than cramps that were relieved using the bathroom. No nausea, vomiting, diarrhea, no recent travel. Patient agreed to follow up appt, reports will call back once she views her work schedule.

## 2022-06-01 ENCOUNTER — OFFICE VISIT (OUTPATIENT)
Dept: NEUROLOGY | Facility: CLINIC | Age: 32
End: 2022-06-01
Payer: COMMERCIAL

## 2022-06-01 VITALS
WEIGHT: 150 LBS | BODY MASS INDEX: 29.45 KG/M2 | DIASTOLIC BLOOD PRESSURE: 70 MMHG | SYSTOLIC BLOOD PRESSURE: 100 MMHG | RESPIRATION RATE: 16 BRPM | HEART RATE: 74 BPM | OXYGEN SATURATION: 98 % | HEIGHT: 60 IN

## 2022-06-01 DIAGNOSIS — U09.9 COVID-19 LONG HAULER: Primary | ICD-10-CM

## 2022-06-01 PROCEDURE — 99204 OFFICE O/P NEW MOD 45 MIN: CPT | Performed by: OTHER

## 2022-06-01 PROCEDURE — 3078F DIAST BP <80 MM HG: CPT | Performed by: OTHER

## 2022-06-01 PROCEDURE — 3074F SYST BP LT 130 MM HG: CPT | Performed by: OTHER

## 2022-06-01 PROCEDURE — 3008F BODY MASS INDEX DOCD: CPT | Performed by: OTHER

## 2022-06-01 NOTE — PATIENT INSTRUCTIONS
Odor:  flowery (conrado), fruity (lemon), aromatic (cloves or lavender) and resinous (eucalyptus)     Take each smell, usually in the form of an oil or scent stick, put it under their nose and deeply inhale that scent for 15-to-20 seconds

## 2022-06-01 NOTE — PROGRESS NOTES
Pt present today states having Post COVID symptoms taste/smell is dull, memory issues and fatigue. Career base as a  and has issues with short term memory. HA pain 4/10 scale.

## 2022-06-07 ENCOUNTER — TELEPHONE (OUTPATIENT)
Dept: BEHAVIORAL HEALTH | Age: 32
End: 2022-06-07

## 2022-06-27 ENCOUNTER — TELEPHONE (OUTPATIENT)
Dept: BEHAVIORAL HEALTH | Age: 32
End: 2022-06-27

## 2022-06-27 RX ORDER — FLUOXETINE HYDROCHLORIDE 20 MG/1
CAPSULE ORAL
Qty: 90 CAPSULE | Refills: 2 | Status: SHIPPED | OUTPATIENT
Start: 2022-06-27 | End: 2022-09-26 | Stop reason: SDUPTHER

## 2022-07-22 ENCOUNTER — TELEPHONE (OUTPATIENT)
Dept: BEHAVIORAL HEALTH | Age: 32
End: 2022-07-22

## 2022-07-22 RX ORDER — HYDROXYZINE HYDROCHLORIDE 10 MG/1
10 TABLET, FILM COATED ORAL 2 TIMES DAILY
Qty: 60 TABLET | Refills: 1 | Status: SHIPPED | OUTPATIENT
Start: 2022-07-22 | End: 2022-09-26

## 2022-08-02 ENCOUNTER — APPOINTMENT (OUTPATIENT)
Dept: BEHAVIORAL HEALTH | Age: 32
End: 2022-08-02

## 2022-08-09 ENCOUNTER — TELEPHONE (OUTPATIENT)
Dept: GENETICS | Facility: HOSPITAL | Age: 32
End: 2022-08-09

## 2022-08-09 NOTE — TELEPHONE ENCOUNTER
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 She has the number to call to schedule, I gave her my direct number as well.

## 2022-08-09 NOTE — TELEPHONE ENCOUNTER
Patient calling to make appointment for Genetic Testing. States her Mother-  Who lives out of state. Had testing and was Positive for Mutation gurwinder gene . Was advised by her Mother to get tested. Patient stated it would be \" free \"  Because her mom was tested. I dont really know how that works, and I am not sure about the out of state testing. So would there be a consult? Or just the lab draw? She also stated her Grandmother was tested at [facility]   But does not know when. And did know know her Arloa Saliva she will try to obtain. Asking if you could please call this patient to review, and discuss the Free portion. I would appreciate it, I do not want to be incorrect.     Thank you

## 2022-08-15 ENCOUNTER — TELEPHONE (OUTPATIENT)
Dept: OBGYN CLINIC | Facility: CLINIC | Age: 32
End: 2022-08-15

## 2022-08-15 NOTE — TELEPHONE ENCOUNTER
Received medical records. Faxed to [facility] to place in Dr Kaela villalpando.   Appointment 11/1/22 4:00 pm Binta

## 2022-09-22 ENCOUNTER — PATIENT MESSAGE (OUTPATIENT)
Dept: INTERNAL MEDICINE CLINIC | Facility: CLINIC | Age: 32
End: 2022-09-22

## 2022-09-23 ENCOUNTER — IMMUNIZATION (OUTPATIENT)
Dept: LAB | Age: 32
End: 2022-09-23
Attending: EMERGENCY MEDICINE

## 2022-09-23 DIAGNOSIS — Z23 NEED FOR VACCINATION: Primary | ICD-10-CM

## 2022-09-23 PROCEDURE — 0134A SARSCOV2 VAC BVL 50MCG/0.5ML: CPT

## 2022-09-26 ENCOUNTER — GENETICS ENCOUNTER (OUTPATIENT)
Dept: GENETICS | Facility: HOSPITAL | Age: 32
End: 2022-09-26

## 2022-09-26 ENCOUNTER — TELEPHONE (OUTPATIENT)
Dept: BEHAVIORAL HEALTH | Age: 32
End: 2022-09-26

## 2022-09-26 ENCOUNTER — NURSE ONLY (OUTPATIENT)
Dept: HEMATOLOGY/ONCOLOGY | Facility: HOSPITAL | Age: 32
End: 2022-09-26

## 2022-09-26 DIAGNOSIS — Z80.0 FAMILY HISTORY OF CANCER OF EXTRAHEPATIC BILE DUCTS: ICD-10-CM

## 2022-09-26 DIAGNOSIS — Z80.9 FAMILY HISTORY OF CANCER: ICD-10-CM

## 2022-09-26 DIAGNOSIS — Z84.81 FAMILY HISTORY OF GENE MUTATION: Primary | ICD-10-CM

## 2022-09-26 DIAGNOSIS — Z80.3 FAMILY HISTORY OF BREAST CANCER: ICD-10-CM

## 2022-09-26 PROCEDURE — 96040 HC GENETIC COUNSELING EA 30 MIN: CPT

## 2022-09-26 PROCEDURE — 36415 COLL VENOUS BLD VENIPUNCTURE: CPT

## 2022-09-26 RX ORDER — FLUOXETINE HYDROCHLORIDE 20 MG/1
CAPSULE ORAL
Qty: 90 CAPSULE | Refills: 2 | Status: SHIPPED | OUTPATIENT
Start: 2022-09-26 | End: 2022-12-02

## 2022-09-26 RX ORDER — HYDROXYZINE HYDROCHLORIDE 10 MG/1
TABLET, FILM COATED ORAL
Qty: 60 TABLET | Refills: 1 | Status: SHIPPED | OUTPATIENT
Start: 2022-09-26 | End: 2022-11-28

## 2022-10-11 ENCOUNTER — GENETICS ENCOUNTER (OUTPATIENT)
Dept: HEMATOLOGY/ONCOLOGY | Facility: HOSPITAL | Age: 32
End: 2022-10-11

## 2022-10-11 DIAGNOSIS — Z15.01 MONOALLELIC MUTATION OF ATM GENE: Primary | ICD-10-CM

## 2022-10-11 DIAGNOSIS — Z15.89 MONOALLELIC MUTATION OF ATM GENE: Primary | ICD-10-CM

## 2022-10-11 DIAGNOSIS — Z15.09 MONOALLELIC MUTATION OF ATM GENE: Primary | ICD-10-CM

## 2022-10-17 ENCOUNTER — TELEPHONE (OUTPATIENT)
Dept: BEHAVIORAL HEALTH | Age: 32
End: 2022-10-17

## 2022-10-17 ENCOUNTER — APPOINTMENT (OUTPATIENT)
Dept: BEHAVIORAL HEALTH | Age: 32
End: 2022-10-17

## 2022-11-01 ENCOUNTER — OFFICE VISIT (OUTPATIENT)
Dept: OBGYN CLINIC | Facility: CLINIC | Age: 32
End: 2022-11-01
Payer: COMMERCIAL

## 2022-11-01 VITALS
HEART RATE: 67 BPM | DIASTOLIC BLOOD PRESSURE: 66 MMHG | HEIGHT: 60 IN | SYSTOLIC BLOOD PRESSURE: 108 MMHG | WEIGHT: 155 LBS | BODY MASS INDEX: 30.43 KG/M2

## 2022-11-01 DIAGNOSIS — Z01.419 ENCOUNTER FOR WELL WOMAN EXAM WITH ROUTINE GYNECOLOGICAL EXAM: Primary | ICD-10-CM

## 2022-11-01 DIAGNOSIS — Z15.09 MONOALLELIC MUTATION OF ATM GENE: ICD-10-CM

## 2022-11-01 DIAGNOSIS — Z15.89 MONOALLELIC MUTATION OF ATM GENE: ICD-10-CM

## 2022-11-01 DIAGNOSIS — Z12.4 SCREENING FOR CERVICAL CANCER: ICD-10-CM

## 2022-11-01 DIAGNOSIS — Z12.39 ENCOUNTER FOR BREAST CANCER SCREENING USING NON-MAMMOGRAM MODALITY: ICD-10-CM

## 2022-11-01 DIAGNOSIS — Z23 NEED FOR VACCINATION: ICD-10-CM

## 2022-11-01 DIAGNOSIS — Z80.3 FH: BREAST CANCER IN FIRST DEGREE RELATIVE WHEN <50 YEARS OLD: ICD-10-CM

## 2022-11-01 DIAGNOSIS — Z15.01 MONOALLELIC MUTATION OF ATM GENE: ICD-10-CM

## 2022-11-01 PROBLEM — D68.59 ANTITHROMBIN III DEFICIENCY (HCC): Status: ACTIVE | Noted: 2021-06-03

## 2022-11-01 PROCEDURE — 99202 OFFICE O/P NEW SF 15 MIN: CPT | Performed by: OBSTETRICS & GYNECOLOGY

## 2022-11-01 PROCEDURE — 90686 IIV4 VACC NO PRSV 0.5 ML IM: CPT | Performed by: OBSTETRICS & GYNECOLOGY

## 2022-11-01 PROCEDURE — 90471 IMMUNIZATION ADMIN: CPT | Performed by: OBSTETRICS & GYNECOLOGY

## 2022-11-01 PROCEDURE — 87624 HPV HI-RISK TYP POOLED RSLT: CPT | Performed by: OBSTETRICS & GYNECOLOGY

## 2022-11-01 PROCEDURE — 99385 PREV VISIT NEW AGE 18-39: CPT | Performed by: OBSTETRICS & GYNECOLOGY

## 2022-11-01 PROCEDURE — 3074F SYST BP LT 130 MM HG: CPT | Performed by: OBSTETRICS & GYNECOLOGY

## 2022-11-01 PROCEDURE — 3078F DIAST BP <80 MM HG: CPT | Performed by: OBSTETRICS & GYNECOLOGY

## 2022-11-01 PROCEDURE — 3008F BODY MASS INDEX DOCD: CPT | Performed by: OBSTETRICS & GYNECOLOGY

## 2022-11-02 LAB — HPV I/H RISK 1 DNA SPEC QL NAA+PROBE: NEGATIVE

## 2022-11-03 DIAGNOSIS — F41.9 ANXIETY: ICD-10-CM

## 2022-11-04 NOTE — TELEPHONE ENCOUNTER
Please review; no protocol    Requested Prescriptions   Pending Prescriptions Disp Refills    ALPRAZOLAM 0.25 MG Oral Tab [Pharmacy Med Name: ALPRAZOLAM 0.25MG TABLETS] 30 tablet 0     Sig: TAKE 1 TABLET BY MOUTH DAILY AS NEEDED FOR ANXIETY       There is no refill protocol information for this order            Recent Outpatient Visits              3 days ago Encounter for well woman exam with routine gynecological exam    [facility], [address] Judson Zee MD    Office Visit    1 month ago     [address] [facility]    Nurse Only    5 months ago COVID-19 [address], [address] [facility]    Office Visit    6 months ago Gallbladder polyp    [facility] Hayde Oropeza MD    Office Visit    6 months ago Chronic nonintractable headache, unspecified headache type    Marvin Reyes MD    Telemedicine

## 2022-11-05 RX ORDER — ALPRAZOLAM 0.25 MG/1
0.25 TABLET ORAL DAILY PRN
Qty: 30 TABLET | Refills: 0 | Status: SHIPPED | OUTPATIENT
Start: 2022-11-05

## 2022-11-28 RX ORDER — HYDROXYZINE HYDROCHLORIDE 10 MG/1
TABLET, FILM COATED ORAL
Qty: 60 TABLET | Refills: 0 | Status: SHIPPED | OUTPATIENT
Start: 2022-11-28 | End: 2022-12-27 | Stop reason: SDUPTHER

## 2022-11-30 ENCOUNTER — TELEPHONE (OUTPATIENT)
Dept: OBGYN CLINIC | Facility: CLINIC | Age: 32
End: 2022-11-30

## 2022-11-30 DIAGNOSIS — Z80.3 FH: BREAST CANCER IN FIRST DEGREE RELATIVE WHEN <50 YEARS OLD: ICD-10-CM

## 2022-11-30 DIAGNOSIS — Z12.31 ENCOUNTER FOR SCREENING MAMMOGRAM FOR MALIGNANT NEOPLASM OF BREAST: Primary | ICD-10-CM

## 2022-11-30 NOTE — TELEPHONE ENCOUNTER
Rachelle from Mammography stating pt needs Mammogram prior to MRI being done per guidelines. Please order- any questions can call Rachelle.

## 2022-12-02 RX ORDER — FLUOXETINE HYDROCHLORIDE 20 MG/1
CAPSULE ORAL
Qty: 90 CAPSULE | Refills: 0 | Status: SHIPPED | OUTPATIENT
Start: 2022-12-02 | End: 2022-12-27 | Stop reason: SDUPTHER

## 2022-12-08 ENCOUNTER — HOSPITAL ENCOUNTER (OUTPATIENT)
Dept: MAMMOGRAPHY | Facility: HOSPITAL | Age: 32
Discharge: HOME | End: 2022-12-08
Attending: OBSTETRICS & GYNECOLOGY
Payer: COMMERCIAL

## 2022-12-08 DIAGNOSIS — Z12.31 ENCOUNTER FOR SCREENING MAMMOGRAM FOR MALIGNANT NEOPLASM OF BREAST: ICD-10-CM

## 2022-12-08 DIAGNOSIS — Z80.3 FH: BREAST CANCER IN FIRST DEGREE RELATIVE WHEN <50 YEARS OLD: ICD-10-CM

## 2022-12-08 PROCEDURE — 77067 SCR MAMMO BI INCL CAD: CPT | Performed by: OBSTETRICS & GYNECOLOGY

## 2022-12-08 PROCEDURE — 77063 BREAST TOMOSYNTHESIS BI: CPT | Performed by: OBSTETRICS & GYNECOLOGY

## 2022-12-21 DIAGNOSIS — F41.9 ANXIETY: ICD-10-CM

## 2022-12-22 RX ORDER — ALPRAZOLAM 0.25 MG/1
TABLET ORAL
Qty: 30 TABLET | Refills: 0 | Status: SHIPPED | OUTPATIENT
Start: 2022-12-22

## 2022-12-27 ENCOUNTER — TELEPHONE (OUTPATIENT)
Dept: BEHAVIORAL HEALTH | Age: 32
End: 2022-12-27

## 2022-12-27 ENCOUNTER — BEHAVIORAL HEALTH (OUTPATIENT)
Dept: BEHAVIORAL HEALTH | Age: 32
End: 2022-12-27

## 2022-12-27 DIAGNOSIS — F33.0 MAJOR DEPRESSIVE DISORDER, RECURRENT EPISODE, MILD (CMD): Primary | ICD-10-CM

## 2022-12-27 DIAGNOSIS — F42.2 MIXED OBSESSIONAL THOUGHTS AND ACTS: ICD-10-CM

## 2022-12-27 DIAGNOSIS — F41.1 GAD (GENERALIZED ANXIETY DISORDER): ICD-10-CM

## 2022-12-27 PROCEDURE — 99212 OFFICE O/P EST SF 10 MIN: CPT | Performed by: PSYCHIATRY & NEUROLOGY

## 2022-12-27 RX ORDER — FLUOXETINE HYDROCHLORIDE 20 MG/1
CAPSULE ORAL
Qty: 120 CAPSULE | Refills: 0 | Status: SHIPPED | OUTPATIENT
Start: 2022-12-27 | End: 2023-02-21

## 2022-12-27 RX ORDER — HYDROXYZINE HYDROCHLORIDE 10 MG/1
10 TABLET, FILM COATED ORAL 2 TIMES DAILY
Qty: 60 TABLET | Refills: 2 | Status: SHIPPED | OUTPATIENT
Start: 2022-12-27 | End: 2023-03-20

## 2022-12-27 RX ORDER — TRAZODONE HYDROCHLORIDE 50 MG/1
50 TABLET ORAL NIGHTLY
Qty: 30 TABLET | Refills: 0 | Status: SHIPPED | OUTPATIENT
Start: 2022-12-27 | End: 2023-01-23

## 2022-12-30 ENCOUNTER — NURSE TRIAGE (OUTPATIENT)
Dept: INTERNAL MEDICINE CLINIC | Facility: CLINIC | Age: 32
End: 2022-12-30

## 2022-12-30 ENCOUNTER — TELEMEDICINE (OUTPATIENT)
Dept: INTERNAL MEDICINE CLINIC | Facility: CLINIC | Age: 32
End: 2022-12-30

## 2022-12-30 DIAGNOSIS — U07.1 COVID-19: Primary | ICD-10-CM

## 2022-12-30 PROCEDURE — 99213 OFFICE O/P EST LOW 20 MIN: CPT | Performed by: NURSE PRACTITIONER

## 2023-01-16 ENCOUNTER — TELEPHONE (OUTPATIENT)
Dept: OBGYN CLINIC | Facility: CLINIC | Age: 33
End: 2023-01-16

## 2023-01-16 NOTE — TELEPHONE ENCOUNTER
Pt calling states she was told that her mammogram would be covered due to family history of breast cancer, pt received a bill in mail. She is not sure if it was coded incorrectly.  Please advise

## 2023-01-16 NOTE — TELEPHONE ENCOUNTER
Patient calling because her mammogram was not covered by insurance. We originally ordered an MRI due to her grandmothers/mother breast cancer Hx and her BREE mutation. But we received a call that the patient can't have an MRI until she has a mammogram on file. Patient advised to contact the mammogram department to see if they have have any advise. Patient to call her insurance again and ask how to initiate an appeal process. Due to her hx she will most likely have to have mammograms/MRI every 6 months. Patient to call back with updates.

## 2023-01-23 RX ORDER — TRAZODONE HYDROCHLORIDE 50 MG/1
TABLET ORAL
Qty: 30 TABLET | Refills: 1 | Status: SHIPPED | OUTPATIENT
Start: 2023-01-23 | End: 2023-05-09

## 2023-01-30 ENCOUNTER — TELEPHONE (OUTPATIENT)
Dept: OBGYN CLINIC | Facility: CLINIC | Age: 33
End: 2023-01-30

## 2023-01-30 NOTE — TELEPHONE ENCOUNTER
Glenn Aldridge put in an order for patient to have mammogram before 37 due to gene mutation. Patient was sent a bill due to previous order being coded wrong. Billing advised patient to contact us and have order changed and send back to a Luis.  Please assist patient

## 2023-01-30 NOTE — TELEPHONE ENCOUNTER
Patient calling because her mammogram was not covered by insurance. We originally ordered an MRI due to her grandmothers/mother breast cancer Hx and her BREE mutation. But we received a call that the patient can't have an MRI until she has a mammogram on file. Due to her hx she will most likely have to have mammograms/MRI every 6 months.     Mammogram was ordered as a screening with the following diagnosis codes:    Z80.3 FH: breast cancer in first degree relative when <48years old Z12.31 Encounter for screening mammogram for malignant neoplasm of breast

## 2023-02-01 NOTE — TELEPHONE ENCOUNTER
Procedure Code 1  06406 - SCR MAMMO BI INCL CAD    Quantity  1 Units    Procedure From - To Date  2023-02-01 - 2023-05-31    Status  NO Edwin Martins    Vendor Name  [address] [place] [address]    Network Status  Unknown  Procedure Code 2  66819 - BREAST TOMOSYNTHESIS BI    Quantity  1 Units    Procedure From - To Date  2023-02-01 - 2023-05-31    Status  NO Edwin Martins    Vendor Name  [address] [place] [address]    Transaction ID: [id number]

## 2023-02-01 NOTE — TELEPHONE ENCOUNTER
Called billing department and got transferred to the hospital billing department since they do the billing for mammograms. They didn't answer so I left them a message to call me back. Unsure what code they want us to use.

## 2023-02-03 NOTE — TELEPHONE ENCOUNTER
Called hospital billing - per EOB charge is not covered. They recommend contacting the insurance. Per [employer] - this plan has specific guidelines about routine mammograms - will not be covered until the age of 28. They recommend contacting [facility] for a Medical necessity. [address] [place] [address] phone number: [phone]  Per [address] [place] [address] no pre-certification needed. Dx code for mammogram given. No authorization number given spoke to Sandra Villareal. Dahlia Tovar recommends calling [employer] to figure out how to cover the mammogram.    Called [employer] back and they state they are not able to cover it due to not being 37. Patient would have to appeal to her employer to have benefits changed. Called patient; no answer. Left a message to call back.

## 2023-02-21 ENCOUNTER — TELEPHONE (OUTPATIENT)
Dept: BEHAVIORAL HEALTH | Age: 33
End: 2023-02-21

## 2023-02-21 RX ORDER — FLUOXETINE HYDROCHLORIDE 20 MG/1
CAPSULE ORAL
Qty: 120 CAPSULE | Refills: 0 | OUTPATIENT
Start: 2023-02-21

## 2023-02-21 RX ORDER — FLUOXETINE HYDROCHLORIDE 20 MG/1
CAPSULE ORAL
Qty: 120 CAPSULE | Refills: 0 | Status: SHIPPED | OUTPATIENT
Start: 2023-02-21 | End: 2023-03-23

## 2023-03-03 ENCOUNTER — PATIENT MESSAGE (OUTPATIENT)
Dept: OBGYN CLINIC | Facility: CLINIC | Age: 33
End: 2023-03-03

## 2023-03-08 NOTE — TELEPHONE ENCOUNTER
Talked to patient  She was notified why she was charged extra for her visit. Due to Dr. Isak Muñoz making a plan of care for her gene mutation question and concern, that's not part of her annual visit. Patient states if she was notified that she was going to be charged extra for asking a question she would have liked an option to accept the plan of care. She states she wasn't giving an option to address the concern and would like the Dr. Clement Chappell to remove the charge. Patient is very frustrated because her insurance has limited plan coverage which is causing her to have large bills out of pocket. She was notified that I did get a few people involved to discuss her mammogram bill. Patient knows that I will give her call back after discussing her office visit charge and after hearing back about her mammogram charge.

## 2023-03-20 RX ORDER — HYDROXYZINE HYDROCHLORIDE 10 MG/1
TABLET, FILM COATED ORAL
Qty: 60 TABLET | Refills: 0 | Status: SHIPPED | OUTPATIENT
Start: 2023-03-20 | End: 2023-04-19

## 2023-03-23 DIAGNOSIS — F41.9 ANXIETY: ICD-10-CM

## 2023-03-23 RX ORDER — ALPRAZOLAM 0.25 MG/1
TABLET ORAL
Qty: 30 TABLET | Refills: 0 | Status: SHIPPED | OUTPATIENT
Start: 2023-03-23

## 2023-03-23 RX ORDER — FLUOXETINE HYDROCHLORIDE 20 MG/1
CAPSULE ORAL
Qty: 120 CAPSULE | Refills: 0 | Status: SHIPPED | OUTPATIENT
Start: 2023-03-23 | End: 2023-04-19

## 2023-03-23 NOTE — TELEPHONE ENCOUNTER
Please review. Protocol failed / No protocol.     Requested Prescriptions   Pending Prescriptions Disp Refills    ALPRAZOLAM 0.25 MG Oral Tab [Pharmacy Med Name: ALPRAZOLAM 0.25MG TABLETS] 30 tablet 0     Sig: TAKE 1 TABLET(0.25 MG) BY MOUTH DAILY AS NEEDED FOR ANXIETY       There is no refill protocol information for this order           Recent Outpatient Visits              2 months ago [address], [address] [facility]    Telemedicine    4 months ago Encounter for well woman exam with routine gynecological exam    [address], [address] [facility] Santo Montenegro MD    Office Visit    5 months ago     [address] [facility]    Nurse Only    9 months ago COVID-19 [facility] Kelsey Qureshi MD    Office Visit    11 months ago Gallbladder polyp    [address], [address] [facility], Janis Mcfadden MD    Office Visit

## 2023-04-19 RX ORDER — HYDROXYZINE HYDROCHLORIDE 10 MG/1
TABLET, FILM COATED ORAL
Qty: 60 TABLET | Refills: 0 | Status: SHIPPED | OUTPATIENT
Start: 2023-04-19 | End: 2023-05-09 | Stop reason: SDUPTHER

## 2023-04-19 RX ORDER — FLUOXETINE HYDROCHLORIDE 20 MG/1
CAPSULE ORAL
Qty: 120 CAPSULE | Refills: 0 | Status: SHIPPED | OUTPATIENT
Start: 2023-04-19 | End: 2023-05-09 | Stop reason: SDUPTHER

## 2023-05-09 ENCOUNTER — BEHAVIORAL HEALTH (OUTPATIENT)
Dept: BEHAVIORAL HEALTH | Age: 33
End: 2023-05-09

## 2023-05-09 DIAGNOSIS — F33.0 MAJOR DEPRESSIVE DISORDER, RECURRENT EPISODE, MILD (CMD): Primary | ICD-10-CM

## 2023-05-09 DIAGNOSIS — F41.1 GAD (GENERALIZED ANXIETY DISORDER): ICD-10-CM

## 2023-05-09 DIAGNOSIS — F42.2 MIXED OBSESSIONAL THOUGHTS AND ACTS: ICD-10-CM

## 2023-05-09 PROCEDURE — 99212 OFFICE O/P EST SF 10 MIN: CPT | Performed by: PSYCHIATRY & NEUROLOGY

## 2023-05-09 RX ORDER — HYDROXYZINE HYDROCHLORIDE 10 MG/1
20 TABLET, FILM COATED ORAL 2 TIMES DAILY
Qty: 120 TABLET | Refills: 2 | Status: SHIPPED | OUTPATIENT
Start: 2023-05-09 | End: 2023-08-25

## 2023-05-09 RX ORDER — FLUOXETINE HYDROCHLORIDE 20 MG/1
40 CAPSULE ORAL 2 TIMES DAILY
Qty: 120 CAPSULE | Refills: 2 | Status: SHIPPED | OUTPATIENT
Start: 2023-05-09 | End: 2023-08-25

## 2023-05-19 DIAGNOSIS — F41.9 ANXIETY: ICD-10-CM

## 2023-05-19 RX ORDER — HYDROXYZINE HYDROCHLORIDE 10 MG/1
TABLET, FILM COATED ORAL
Qty: 60 TABLET | OUTPATIENT
Start: 2023-05-19

## 2023-05-20 RX ORDER — ALPRAZOLAM 0.25 MG/1
TABLET ORAL
Qty: 30 TABLET | Refills: 0 | OUTPATIENT
Start: 2023-05-20

## 2023-05-20 NOTE — TELEPHONE ENCOUNTER
Please review refill protocol failed/ no protocol  Requested Prescriptions   Pending Prescriptions Disp Refills    ALPRAZOLAM 0.25 MG Oral Tab [Pharmacy Med Name: ALPRAZOLAM 0.25MG TABLETS] 30 tablet 0     Sig: TAKE 1 TABLET(0.25 MG) BY MOUTH DAILY AS NEEDED FOR ANXIETY       There is no refill protocol information for this order

## 2023-06-23 DIAGNOSIS — F41.9 ANXIETY: ICD-10-CM

## 2023-06-24 RX ORDER — ALPRAZOLAM 0.25 MG/1
0.25 TABLET ORAL DAILY PRN
Qty: 30 TABLET | Refills: 0 | OUTPATIENT
Start: 2023-06-24

## 2023-06-24 NOTE — TELEPHONE ENCOUNTER
Pt well overdue for an appointment for a controlled substance  Please see note from 5/20/2023 this patient has been advised to make a follow-up appointment multiple times  No more refill until patient is seen again  If she has changed providers, please remove me as the PCP

## 2023-06-24 NOTE — TELEPHONE ENCOUNTER
Please review. Protocol failed or has no protocol. Requested Prescriptions   Pending Prescriptions Disp Refills    ALPRAZolam 0.25 MG Oral Tab 30 tablet 0     Sig: Take 1 tablet (0.25 mg total) by mouth daily as needed for Anxiety.        There is no refill protocol information for this order          Recent Outpatient Visits              5 months ago [address], [address] [facility] [place] [place]    Telemedicine    7 months ago Encounter for well woman exam with routine gynecological exam    Phuong Parada MD    Office Visit    9 months ago     [address] [facility]    Nurse Only    1 year ago COVID-19 long Sheldon Hummel MD    Office Visit    1 year ago Gallbladder polyp    Ken Rockwell MD    Office Visit

## 2023-07-26 ENCOUNTER — OFFICE VISIT (OUTPATIENT)
Dept: INTERNAL MEDICINE CLINIC | Facility: CLINIC | Age: 33
End: 2023-07-26

## 2023-07-26 VITALS
WEIGHT: 162 LBS | DIASTOLIC BLOOD PRESSURE: 68 MMHG | SYSTOLIC BLOOD PRESSURE: 104 MMHG | RESPIRATION RATE: 16 BRPM | HEIGHT: 60 IN | BODY MASS INDEX: 31.8 KG/M2

## 2023-07-26 DIAGNOSIS — K82.4 GALLBLADDER POLYP: ICD-10-CM

## 2023-07-26 DIAGNOSIS — Z80.3 FAMILY HISTORY OF BREAST CANCER IN MOTHER: ICD-10-CM

## 2023-07-26 DIAGNOSIS — F41.9 ANXIETY: ICD-10-CM

## 2023-07-26 DIAGNOSIS — Z00.00 PHYSICAL EXAM, ANNUAL: Primary | ICD-10-CM

## 2023-07-26 PROCEDURE — 3008F BODY MASS INDEX DOCD: CPT | Performed by: INTERNAL MEDICINE

## 2023-07-26 PROCEDURE — 3078F DIAST BP <80 MM HG: CPT | Performed by: INTERNAL MEDICINE

## 2023-07-26 PROCEDURE — 3074F SYST BP LT 130 MM HG: CPT | Performed by: INTERNAL MEDICINE

## 2023-07-26 PROCEDURE — 99395 PREV VISIT EST AGE 18-39: CPT | Performed by: INTERNAL MEDICINE

## 2023-07-26 RX ORDER — ALPRAZOLAM 0.25 MG/1
0.25 TABLET ORAL DAILY PRN
Qty: 30 TABLET | Refills: 0 | Status: SHIPPED | OUTPATIENT
Start: 2023-07-26

## 2023-08-25 RX ORDER — FLUOXETINE HYDROCHLORIDE 20 MG/1
CAPSULE ORAL
Qty: 120 CAPSULE | Refills: 0 | Status: SHIPPED | OUTPATIENT
Start: 2023-08-25 | End: 2023-09-25

## 2023-08-25 RX ORDER — HYDROXYZINE HYDROCHLORIDE 10 MG/1
20 TABLET, FILM COATED ORAL 2 TIMES DAILY
Qty: 120 TABLET | Refills: 0 | Status: SHIPPED | OUTPATIENT
Start: 2023-08-25 | End: 2023-09-25

## 2023-09-25 RX ORDER — FLUOXETINE HYDROCHLORIDE 20 MG/1
CAPSULE ORAL
Qty: 120 CAPSULE | Refills: 0 | Status: SHIPPED | OUTPATIENT
Start: 2023-09-25 | End: 2023-10-30

## 2023-09-25 RX ORDER — HYDROXYZINE HYDROCHLORIDE 10 MG/1
20 TABLET, FILM COATED ORAL 2 TIMES DAILY
Qty: 120 TABLET | Refills: 0 | Status: SHIPPED | OUTPATIENT
Start: 2023-09-25 | End: 2023-10-30

## 2023-10-17 ENCOUNTER — APPOINTMENT (OUTPATIENT)
Dept: BEHAVIORAL HEALTH | Age: 33
End: 2023-10-17

## 2023-10-30 RX ORDER — HYDROXYZINE HYDROCHLORIDE 10 MG/1
20 TABLET, FILM COATED ORAL 2 TIMES DAILY
Qty: 120 TABLET | Refills: 0 | Status: SHIPPED | OUTPATIENT
Start: 2023-10-30

## 2023-10-30 RX ORDER — FLUOXETINE HYDROCHLORIDE 20 MG/1
CAPSULE ORAL
Qty: 120 CAPSULE | Refills: 0 | Status: SHIPPED | OUTPATIENT
Start: 2023-10-30

## 2023-11-29 RX ORDER — HYDROXYZINE HYDROCHLORIDE 10 MG/1
20 TABLET, FILM COATED ORAL 2 TIMES DAILY
Qty: 120 TABLET | Refills: 0 | Status: SHIPPED | OUTPATIENT
Start: 2023-11-29 | End: 2023-12-04 | Stop reason: SDUPTHER

## 2023-11-29 RX ORDER — FLUOXETINE HYDROCHLORIDE 20 MG/1
CAPSULE ORAL
Qty: 120 CAPSULE | Refills: 0 | Status: SHIPPED | OUTPATIENT
Start: 2023-11-29 | End: 2023-12-04 | Stop reason: SDUPTHER

## 2023-12-04 ENCOUNTER — BEHAVIORAL HEALTH (OUTPATIENT)
Dept: BEHAVIORAL HEALTH | Age: 33
End: 2023-12-04

## 2023-12-04 DIAGNOSIS — F41.1 GAD (GENERALIZED ANXIETY DISORDER): ICD-10-CM

## 2023-12-04 DIAGNOSIS — F42.2 MIXED OBSESSIONAL THOUGHTS AND ACTS: ICD-10-CM

## 2023-12-04 DIAGNOSIS — F33.0 MAJOR DEPRESSIVE DISORDER, RECURRENT EPISODE, MILD (CMD): Primary | ICD-10-CM

## 2023-12-04 PROCEDURE — 99214 OFFICE O/P EST MOD 30 MIN: CPT | Performed by: PSYCHIATRY & NEUROLOGY

## 2023-12-04 RX ORDER — HYDROXYZINE HYDROCHLORIDE 10 MG/1
20 TABLET, FILM COATED ORAL 2 TIMES DAILY
Qty: 120 TABLET | Refills: 2 | Status: SHIPPED | OUTPATIENT
Start: 2023-12-04

## 2023-12-04 RX ORDER — FLUOXETINE HYDROCHLORIDE 20 MG/1
40 CAPSULE ORAL 2 TIMES DAILY
Qty: 120 CAPSULE | Refills: 1 | Status: SHIPPED | OUTPATIENT
Start: 2023-12-04

## 2023-12-04 RX ORDER — ARIPIPRAZOLE 5 MG/1
5 TABLET ORAL DAILY
Qty: 30 TABLET | Refills: 0 | Status: SHIPPED | OUTPATIENT
Start: 2023-12-04 | End: 2024-01-03

## 2024-03-25 ENCOUNTER — APPOINTMENT (OUTPATIENT)
Dept: BEHAVIORAL HEALTH | Age: 34
End: 2024-03-25

## 2024-03-25 DIAGNOSIS — F42.2 MIXED OBSESSIONAL THOUGHTS AND ACTS: ICD-10-CM

## 2024-03-25 DIAGNOSIS — F33.0 MAJOR DEPRESSIVE DISORDER, RECURRENT EPISODE, MILD (CMD): Primary | ICD-10-CM

## 2024-03-25 DIAGNOSIS — F41.1 GAD (GENERALIZED ANXIETY DISORDER): ICD-10-CM

## 2024-03-25 PROCEDURE — 99213 OFFICE O/P EST LOW 20 MIN: CPT | Performed by: PSYCHIATRY & NEUROLOGY

## 2024-03-25 RX ORDER — FLUOXETINE HYDROCHLORIDE 20 MG/1
40 CAPSULE ORAL 2 TIMES DAILY
Qty: 120 CAPSULE | Refills: 1 | Status: SHIPPED | OUTPATIENT
Start: 2024-03-25

## 2024-03-25 RX ORDER — ARIPIPRAZOLE 2 MG/1
2 TABLET ORAL DAILY
Qty: 30 TABLET | Refills: 1 | Status: SHIPPED | OUTPATIENT
Start: 2024-03-25

## 2024-03-25 RX ORDER — HYDROXYZINE HYDROCHLORIDE 10 MG/1
20 TABLET, FILM COATED ORAL 2 TIMES DAILY
Qty: 120 TABLET | Refills: 2 | Status: SHIPPED | OUTPATIENT
Start: 2024-03-25

## 2024-06-24 RX ORDER — FLUOXETINE HYDROCHLORIDE 20 MG/1
CAPSULE ORAL
Qty: 120 CAPSULE | Refills: 1 | Status: SHIPPED | OUTPATIENT
Start: 2024-06-24

## 2024-07-03 ENCOUNTER — APPOINTMENT (OUTPATIENT)
Dept: BEHAVIORAL HEALTH | Age: 34
End: 2024-07-03

## 2024-07-03 DIAGNOSIS — F33.0 MAJOR DEPRESSIVE DISORDER, RECURRENT EPISODE, MILD (CMD): Primary | ICD-10-CM

## 2024-07-03 DIAGNOSIS — F42.2 MIXED OBSESSIONAL THOUGHTS AND ACTS: ICD-10-CM

## 2024-07-03 DIAGNOSIS — F41.1 GAD (GENERALIZED ANXIETY DISORDER): ICD-10-CM

## 2024-07-03 RX ORDER — HYDROXYZINE HYDROCHLORIDE 10 MG/1
20 TABLET, FILM COATED ORAL 2 TIMES DAILY
Qty: 120 TABLET | Refills: 2 | Status: SHIPPED | OUTPATIENT
Start: 2024-07-03

## 2024-07-03 RX ORDER — FLUOXETINE HYDROCHLORIDE 20 MG/1
40 CAPSULE ORAL 2 TIMES DAILY
Qty: 120 CAPSULE | Refills: 2 | Status: SHIPPED | OUTPATIENT
Start: 2024-07-03

## 2024-07-30 NOTE — TELEPHONE ENCOUNTER
Email response sent to patient. [Time Spent: ___ minutes] : I have spent [unfilled] minutes of time on the encounter.

## 2024-08-12 RX ORDER — HYDROXYZINE HYDROCHLORIDE 10 MG/1
20 TABLET, FILM COATED ORAL 2 TIMES DAILY
Qty: 120 TABLET | Refills: 2 | OUTPATIENT
Start: 2024-08-12

## 2024-09-23 ENCOUNTER — TELEMEDICINE (OUTPATIENT)
Dept: INTERNAL MEDICINE CLINIC | Facility: CLINIC | Age: 34
End: 2024-09-23

## 2024-09-23 DIAGNOSIS — U07.1 COVID-19: Primary | ICD-10-CM

## 2024-09-23 DIAGNOSIS — R06.09 DYSPNEA ON EXERTION: ICD-10-CM

## 2024-09-23 PROCEDURE — 99213 OFFICE O/P EST LOW 20 MIN: CPT | Performed by: NURSE PRACTITIONER

## 2024-09-23 RX ORDER — FLUTICASONE PROPIONATE 50 MCG
2 SPRAY, SUSPENSION (ML) NASAL DAILY
Qty: 11.1 ML | Refills: 0 | Status: SHIPPED | OUTPATIENT
Start: 2024-09-23 | End: 2025-09-18

## 2024-09-23 NOTE — PROGRESS NOTES
Virtual Visit Check-In    ANNABEL WELLINGTON or legal guardian   verbally consents to a Virtual Visit Check-In service on 9/23/2024    Patient understands and accepts financial responsibility for any deductible, co-insurance and/or co-pays associated with this service.    Duration of the service:   Call duration  15 minutes   Video visit     Chief Complaint   Patient presents with    Covid       HPI:    Patient ID: Annabel Wellington is a 34 year old female. She presents with cough, dizziness, headaches, shortness of breath with exertion, congestion, postnasal drip, sore throat and runny nose. Her Symptoms around Friday at 5pm. She tested positive for Covid-19 yesterday. She has been taking mucinex for her symptoms. She did take tylenol as well for fevers. She denies fevers or chest pain today. She did have Covid-19 twice. She cannot taste certain things as a result of having covid-19. She is a teacher.       ROS    Review of Systems   Constitutional:  Negative for fever.   HENT:  Positive for congestion, postnasal drip, rhinorrhea and sore throat. Negative for ear pain.    Respiratory:  Positive for cough and shortness of breath. Negative for wheezing.    Cardiovascular:  Negative for chest pain.   Gastrointestinal:  Positive for diarrhea. Negative for abdominal pain, constipation, nausea and vomiting.   Genitourinary: Negative.    Musculoskeletal:  Negative for arthralgias.   Skin: Negative.    Neurological:  Positive for dizziness and headaches.   Psychiatric/Behavioral: Negative.               Current Outpatient Medications   Medication Sig Dispense Refill    fluticasone propionate 50 MCG/ACT Nasal Suspension 2 sprays by Each Nare route daily. 11.1 mL 0    ALPRAZolam 0.25 MG Oral Tab Take 1 tablet (0.25 mg total) by mouth daily as needed. 30 tablet 0    FLUoxetine HCl 20 MG Oral Cap Take 2 capsules (40 mg total) by mouth 2 (two) times daily. Take 1 tablet in the a.m. and 2 tablets in the p.m      hydrOXYzine  HCl 10 MG Oral Tab Take 2 tablets (20 mg total) by mouth every 8 (eight) hours as needed. Patient reports taking 2 tablet BID         Allergies:No Known Allergies       Current Outpatient Medications:     fluticasone propionate 50 MCG/ACT Nasal Suspension, 2 sprays by Each Nare route daily., Disp: 11.1 mL, Rfl: 0    ALPRAZolam 0.25 MG Oral Tab, Take 1 tablet (0.25 mg total) by mouth daily as needed., Disp: 30 tablet, Rfl: 0    FLUoxetine HCl 20 MG Oral Cap, Take 2 capsules (40 mg total) by mouth 2 (two) times daily. Take 1 tablet in the a.m. and 2 tablets in the p.m, Disp: , Rfl:     hydrOXYzine HCl 10 MG Oral Tab, Take 2 tablets (20 mg total) by mouth every 8 (eight) hours as needed. 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  Alert and oriented x 3  Patient was responding to questions appropriately.  Patient did not appear short of breath.    ASSESSMENT/PLAN:     Encounter Diagnoses   Name Primary?    COVID-19 Yes    Dyspnea on exertion        1. COVID-19  -Okay to continue Mucinex as needed for congestion  -Will add Flonase nasal spray.  -Patient out of window for Paxlovid and declines  -Discussed with patient to increase fluid intake and to continue Tylenol as needed for fevers  -Patient needs to be fever free for 24 hours without taking any Tylenol in order to return back to work  -Go to the ER if symptoms worsen  - fluticasone propionate 50 MCG/ACT Nasal Suspension; 2 sprays by Each Nare route daily.   Dispense: 11.1 mL; Refill: 0    2. Dyspnea on exertion  - XR CHEST PA + LAT CHEST (CPT=71046); Future      Patient reminded to practice good health and safety measures including washing hands, social distancing, covering mouth when coughing/ sneezing, avoid social meetings/ gatherings in face of this Covid 19 pandemic.    Patient verbalized understanding of plan and all questions answered to the best of my ability.    Patient to call back if any change/ worsening of symptoms.    No orders of the defined types were placed in this encounter.      Meds This Visit:  Requested Prescriptions     Signed Prescriptions Disp Refills    fluticasone propionate 50 MCG/ACT Nasal Suspension 11.1 mL 0     Si sprays by Each Nare route daily.       Imaging & Referrals:  XR CHEST PA + LAT CHEST (IQA=[id number])               Ashley Orlando, APRN  2024  11:42 AM      Please note that the following visit was completed using two-way, real-time interactive audio and/or video communication.  This has been done in good nii to provide continuity of care in the best interest of the provider-patient relationship, due to the ongoing public health crisis/national emergency and because of restrictions of visitation.  There are limitations of this visit as no physical exam could be performed.  Every conscious effort was taken to allow for sufficient and adequate time.  This billing was spent on reviewing labs, medications, radiology tests and decision making.  Appropriate medical decision-making and tests are ordered as detailed in the plan of care above  ID#[id number]

## 2024-10-30 RX ORDER — HYDROXYZINE HYDROCHLORIDE 10 MG/1
20 TABLET, FILM COATED ORAL 2 TIMES DAILY
Qty: 120 TABLET | Refills: 0 | Status: SHIPPED | OUTPATIENT
Start: 2024-10-30

## 2024-12-18 ENCOUNTER — E-ADVICE (OUTPATIENT)
Age: 34
End: 2024-12-18

## 2024-12-18 RX ORDER — HYDROXYZINE HYDROCHLORIDE 10 MG/1
20 TABLET, FILM COATED ORAL 2 TIMES DAILY
Qty: 120 TABLET | Refills: 0 | Status: SHIPPED | OUTPATIENT
Start: 2024-12-18

## 2025-01-08 ENCOUNTER — APPOINTMENT (OUTPATIENT)
Age: 35
End: 2025-01-08

## 2025-01-08 DIAGNOSIS — F33.0 MAJOR DEPRESSIVE DISORDER, RECURRENT EPISODE, MILD (CMD): Primary | ICD-10-CM

## 2025-01-08 DIAGNOSIS — F42.2 MIXED OBSESSIONAL THOUGHTS AND ACTS: ICD-10-CM

## 2025-01-08 DIAGNOSIS — F41.1 GAD (GENERALIZED ANXIETY DISORDER): ICD-10-CM

## 2025-01-08 RX ORDER — HYDROXYZINE HYDROCHLORIDE 10 MG/1
20 TABLET, FILM COATED ORAL 2 TIMES DAILY
Qty: 120 TABLET | Refills: 0 | Status: SHIPPED | OUTPATIENT
Start: 2025-01-08

## 2025-03-04 ENCOUNTER — NURSE TRIAGE (OUTPATIENT)
Dept: INTERNAL MEDICINE CLINIC | Facility: CLINIC | Age: 35
End: 2025-03-04

## 2025-03-04 ENCOUNTER — OFFICE VISIT (OUTPATIENT)
Dept: INTERNAL MEDICINE CLINIC | Facility: CLINIC | Age: 35
End: 2025-03-04
Payer: COMMERCIAL

## 2025-03-04 VITALS
DIASTOLIC BLOOD PRESSURE: 78 MMHG | HEIGHT: 60 IN | SYSTOLIC BLOOD PRESSURE: 114 MMHG | WEIGHT: 178 LBS | HEART RATE: 69 BPM | BODY MASS INDEX: 34.95 KG/M2 | OXYGEN SATURATION: 99 %

## 2025-03-04 DIAGNOSIS — R07.89 CHEST WALL PAIN: Primary | ICD-10-CM

## 2025-03-04 PROCEDURE — 3074F SYST BP LT 130 MM HG: CPT | Performed by: NURSE PRACTITIONER

## 2025-03-04 PROCEDURE — 99213 OFFICE O/P EST LOW 20 MIN: CPT | Performed by: NURSE PRACTITIONER

## 2025-03-04 PROCEDURE — 3008F BODY MASS INDEX DOCD: CPT | Performed by: NURSE PRACTITIONER

## 2025-03-04 PROCEDURE — 3078F DIAST BP <80 MM HG: CPT | Performed by: NURSE PRACTITIONER

## 2025-03-04 NOTE — TELEPHONE ENCOUNTER
Action Requested: Summary for Provider     []  Critical Lab, Recommendations Needed  [] Need Additional Advice  []   FYI    []   Need Orders  [] Need Medications Sent to Pharmacy  []  Other     SUMMARY:  Middle chest discomfort, below sternum, on and off since Thursday. It only lasted for a few seconds and then moved to the area under her left breast. The pain was worse and unbearable yesterday, but now it's just a little dull ache/soreness. Currently, it's bearable dull pain.       Advise  urgent care or immediate care for worsening symptoms, and go to the emergency room for shortness of breath or  severe chest pain.    Future Appointments   Date Time Provider Department Center   3/4/2025  6:00 PM Teresa Ventura, APRN [id number]       Reason for call: Chest Pain  Onset: Thursday ==5 days now    Per patient , she has a family history of genetic mutation.   [facility] location provided.   Reason for Disposition   Chest pain(s) lasting a few seconds persists > 3 days    Protocols used: Chest Pain-A-[place]

## 2025-03-04 NOTE — TELEPHONE ENCOUNTER
Please call patient directly regarding her [employer] message    Dr. Ocasio,   I have been experiencing some chest discomfort located in the middle of my chest, below my sternum, and the pain has now moved to the area under my breasts, along my rib cage. It was made worse from wearing a bra. The discomfort began as tenderness that only reacted when disturbed. And over the weekend it became progressively worse and more tender.      The area is tender and certain movements and very deep breaths cause a dull pain. It feels almost as if there is some tightness there.  I’m not sure if it’s from my bra or if the bra just made it worse.

## 2025-03-05 ENCOUNTER — APPOINTMENT (OUTPATIENT)
Dept: GENERAL RADIOLOGY | Facility: HOSPITAL | Age: 35
End: 2025-03-05
Attending: EMERGENCY MEDICINE
Payer: COMMERCIAL

## 2025-03-05 ENCOUNTER — TELEPHONE (OUTPATIENT)
Dept: INTERNAL MEDICINE CLINIC | Facility: CLINIC | Age: 35
End: 2025-03-05

## 2025-03-05 ENCOUNTER — HOSPITAL ENCOUNTER (EMERGENCY)
Facility: HOSPITAL | Age: 35
Discharge: HOME | End: 2025-03-05
Attending: EMERGENCY MEDICINE
Payer: COMMERCIAL

## 2025-03-05 VITALS
RESPIRATION RATE: 22 BRPM | TEMPERATURE: 98 F | BODY MASS INDEX: 34.95 KG/M2 | HEART RATE: 68 BPM | DIASTOLIC BLOOD PRESSURE: 71 MMHG | HEIGHT: 60 IN | WEIGHT: 178 LBS | OXYGEN SATURATION: 100 % | SYSTOLIC BLOOD PRESSURE: 107 MMHG

## 2025-03-05 DIAGNOSIS — R07.9 CHEST PAIN OF UNCERTAIN ETIOLOGY: Primary | ICD-10-CM

## 2025-03-05 LAB
ALBUMIN SERPL-MCNC: 4.6 G/DL (ref 3.2–4.8)
ALBUMIN/GLOB SERPL: 1.5 {RATIO} (ref 1–2)
ALP LIVER SERPL-CCNC: 67 U/L
ALT SERPL-CCNC: 13 U/L
ANION GAP SERPL CALC-SCNC: 4 MMOL/L (ref 0–18)
AST SERPL-CCNC: 17 U/L (ref ?–34)
ATRIAL RATE: 71 BPM
BASOPHILS # BLD AUTO: 0.04 X10(3) UL (ref 0–0.2)
BASOPHILS NFR BLD AUTO: 0.5 %
BILIRUB SERPL-MCNC: 0.3 MG/DL (ref 0.3–1.2)
BUN BLD-MCNC: 12 MG/DL (ref 9–23)
CALCIUM BLD-MCNC: 9.4 MG/DL (ref 8.7–10.6)
CHLORIDE SERPL-SCNC: 105 MMOL/L (ref 98–112)
CO2 SERPL-SCNC: 30 MMOL/L (ref 21–32)
CREAT BLD-MCNC: 0.88 MG/DL
D DIMER PPP FEU-MCNC: <0.27 UG/ML FEU (ref ?–0.5)
EGFRCR SERPLBLD CKD-EPI 2021: 89 ML/MIN/1.73M2 (ref 60–?)
EOSINOPHIL # BLD AUTO: 0.06 X10(3) UL (ref 0–0.7)
EOSINOPHIL NFR BLD AUTO: 0.8 %
ERYTHROCYTE [DISTWIDTH] IN BLOOD BY AUTOMATED COUNT: 13.6 %
GLOBULIN PLAS-MCNC: 3 G/DL (ref 2–3.5)
GLUCOSE BLD-MCNC: 95 MG/DL (ref 70–99)
HCT VFR BLD AUTO: 37.3 %
HGB BLD-MCNC: 12 G/DL
IMM GRANULOCYTES # BLD AUTO: 0.02 X10(3) UL (ref 0–1)
IMM GRANULOCYTES NFR BLD: 0.3 %
LYMPHOCYTES # BLD AUTO: 1.96 X10(3) UL (ref 1–4)
LYMPHOCYTES NFR BLD AUTO: 26.2 %
MCH RBC QN AUTO: 25.1 PG (ref 26–34)
MCHC RBC AUTO-ENTMCNC: 32.2 G/DL (ref 31–37)
MCV RBC AUTO: 78 FL
MONOCYTES # BLD AUTO: 0.53 X10(3) UL (ref 0.1–1)
MONOCYTES NFR BLD AUTO: 7.1 %
NEUTROPHILS # BLD AUTO: 4.86 X10 (3) UL (ref 1.5–7.7)
NEUTROPHILS # BLD AUTO: 4.86 X10(3) UL (ref 1.5–7.7)
NEUTROPHILS NFR BLD AUTO: 65.1 %
OSMOLALITY SERPL CALC.SUM OF ELEC: 288 MOSM/KG (ref 275–295)
P AXIS: 45 DEGREES
P-R INTERVAL: 148 MS
PLATELET # BLD AUTO: 258 10(3)UL (ref 150–450)
POTASSIUM SERPL-SCNC: 4.1 MMOL/L (ref 3.5–5.1)
PROT SERPL-MCNC: 7.6 G/DL (ref 5.7–8.2)
Q-T INTERVAL: 400 MS
QRS DURATION: 74 MS
QTC CALCULATION (BEZET): 434 MS
R AXIS: 55 DEGREES
RBC # BLD AUTO: 4.78 X10(6)UL
SODIUM SERPL-SCNC: 139 MMOL/L (ref 136–145)
T AXIS: 33 DEGREES
TROPONIN I SERPL HS-MCNC: <3 NG/L
VENTRICULAR RATE: 71 BPM
WBC # BLD AUTO: 7.5 X10(3) UL (ref 4–11)

## 2025-03-05 PROCEDURE — 85379 FIBRIN DEGRADATION QUANT: CPT | Performed by: EMERGENCY MEDICINE

## 2025-03-05 PROCEDURE — 99285 EMERGENCY DEPT VISIT HI MDM: CPT

## 2025-03-05 PROCEDURE — 93005 ELECTROCARDIOGRAM TRACING: CPT

## 2025-03-05 PROCEDURE — 84484 ASSAY OF TROPONIN QUANT: CPT | Performed by: EMERGENCY MEDICINE

## 2025-03-05 PROCEDURE — 93010 ELECTROCARDIOGRAM REPORT: CPT

## 2025-03-05 PROCEDURE — 36415 COLL VENOUS BLD VENIPUNCTURE: CPT

## 2025-03-05 PROCEDURE — 80053 COMPREHEN METABOLIC PANEL: CPT | Performed by: EMERGENCY MEDICINE

## 2025-03-05 PROCEDURE — 85025 COMPLETE CBC W/AUTO DIFF WBC: CPT | Performed by: EMERGENCY MEDICINE

## 2025-03-05 PROCEDURE — 71045 X-RAY EXAM CHEST 1 VIEW: CPT | Performed by: EMERGENCY MEDICINE

## 2025-03-05 PROCEDURE — 99284 EMERGENCY DEPT VISIT MOD MDM: CPT

## 2025-03-05 NOTE — ED INITIAL ASSESSMENT (HPI)
Pt to the emergency room for L rib pain since Sunday, denies injury, hurts with deep breathing. Seen primary care office earlier today, no testing at this time. Instructed to go to ER with continued pain. Pain noted with palpation and changes in positioning (Laying down). No recent illness. This morning at midnight pt states that when waking she felt disoriented, pt reports being able to ambulate to bathroom and improvement in symptoms. No other complaints at this time.

## 2025-03-05 NOTE — PROGRESS NOTES
Cuco Gonzalez is a 34 year old female.  HPI:   Pt is new to me. Hx of Antithrombin III deficiency. Reports one week ago developed pain in the sternum and it has moved under the left breast and now is located on the lateral chest/rib left side breast. She denies any deeper pain, states she feels it along the rib and feels it may be related to her bra underwire. Denies any pain at the center of chest is resolved but still has pain along bone underneath the left breast. Pain worsens with movement.   She denies any deeper chest pain, SOB, HA, anxiety, dizziness, vision changes, palpitations, swelling. Denies any hx of DVT/PE.  Current Outpatient Medications   Medication Sig Dispense Refill    ALPRAZolam 0.25 MG Oral Tab Take 1 tablet (0.25 mg total) by mouth daily as needed. 30 tablet 0    FLUoxetine HCl 20 MG Oral Cap Take 2 capsules (40 mg total) by mouth 2 (two) times daily. Take 1 tablet in the a.m. and 2 tablets in the p.m      hydrOXYzine HCl 10 MG Oral Tab Take 2 tablets (20 mg total) by mouth every 8 (eight) hours as needed. Patient reports taking 2 tablet BID        Past Medical History:    Anxiety    Depression    Gallbladder polyp    Infectious mononucleosis    Monoallelic mutation of BREE gene    +BREE c.8147T>C (p.[id number]) likely pathogenic variant, report in media tab    Overactive bladder      Social History:  Social History     Socioeconomic History    Marital status: Single    Number of children: 0   Occupational History    Occupation: , history     Employer:  [employer]   Tobacco Use    Smoking status: Never    Smokeless tobacco: Never   Vaping Use    Vaping status: Never Used   Substance and Sexual Activity    Alcohol use: Yes     Comment: social occasional    Drug use: No    Sexual activity: Yes   Other Topics Concern    Caffeine Concern Yes     Comment: 0-1 CUPS PER DAY    Sleep Concern No    Stress Concern No    Exercise Yes    Seat Belt Yes        REVIEW OF  SYSTEMS:   Review of Systems   Constitutional:  Negative for activity change, appetite change, chills, diaphoresis, fatigue, fever and unexpected weight change.   HENT:  Negative for congestion.    Eyes:  Negative for visual disturbance.   Respiratory:  Negative for cough, chest tightness, shortness of breath and wheezing.    Cardiovascular:  Negative for chest pain, palpitations and leg swelling.   Gastrointestinal:  Negative for abdominal pain, constipation, diarrhea, nausea and vomiting.   Endocrine: Negative.    Genitourinary:  Negative for difficulty urinating and vaginal bleeding.   Musculoskeletal:  Negative for arthralgias and back pain.   Skin: Negative.    Neurological:  Negative for dizziness, seizures, numbness and headaches.   Hematological: Negative.    Psychiatric/Behavioral: Negative.            EXAM:   /78 (BP Location: Right arm, Patient Position: Sitting, Cuff Size: adult)   Pulse 69   Ht 5' (1.524 m)   Wt 178 lb (80.7 kg)   LMP 02/24/2025 (Approximate)   SpO2 99%   BMI 34.76 kg/m²     Physical Exam  Vitals reviewed.   Constitutional:       General: She is not in acute distress.     Appearance: Normal appearance. She is not ill-appearing or diaphoretic.   HENT:      Head: Normocephalic.   Eyes:      General: No scleral icterus.     Conjunctiva/sclera: Conjunctivae normal.      Pupils: Pupils are equal, round, and reactive to light.   Cardiovascular:      Rate and Rhythm: Normal rate and regular rhythm.      Pulses: Normal pulses.      Heart sounds: Normal heart sounds. No murmur heard.  Pulmonary:      Effort: Pulmonary effort is normal. No respiratory distress.      Breath sounds: Normal breath sounds. No stridor. No wheezing, rhonchi or rales.   Chest:      Chest wall: Tenderness present. No mass, lacerations, deformity, swelling, crepitus or edema. There is no dullness to percussion.          Comments: Tenderness to palpation of the left lateral inferior chest extending under left  breast.   Abdominal:      General: Abdomen is flat. Bowel sounds are normal. There is no distension.      Palpations: Abdomen is soft. There is no mass.      Tenderness: There is no abdominal tenderness.   Musculoskeletal:         General: No swelling. Normal range of motion.      Cervical back: Normal range of motion and neck supple.      Right lower leg: No edema.      Left lower leg: No edema.   Skin:     General: Skin is warm.      Coloration: Skin is not jaundiced.      Findings: No erythema.   Neurological:      General: No focal deficit present.      Mental Status: She is alert and oriented to person, place, and time.      Cranial Nerves: No cranial nerve deficit.   Psychiatric:         Thought Content: Thought content normal.         Judgment: Judgment normal.            ASSESSMENT AND PLAN:     Assessment & Plan  Chest wall pain  Pain reproducible on exam.  Ddx: costochondritis.   Will order CXR.   Reviewed with patient concerning s/s and when to go to ER.   Tylenol PRN, topical biofreeze or similar OTC.   Close f/u with PCP in 3 days, sooner PRN.   Advised to schedule annual physical.   Orders:    XR RIBS WITH CHEST, BILATERAL   (CPT=71111); Future         The patient indicates understanding of these issues and agrees to the plan.  The patient is asked to return in 3 days/PRN.     The above note was creating using Dragon speech recognition technology. Please excuse any typos.

## 2025-03-05 NOTE — TELEPHONE ENCOUNTER
Patient saw Teresa on 3/24/25 and was advised to go to ER for chest pain and history of blood clots. Everything was okay. She still has pain by her chest and would like to follow up with Dr. Ocasio. Advised Teresa's note. Dr. Ocasio do you want to see patient sooner? 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     Will order CXR.   Reviewed with patient concerning s/s and when to go to ER.   Tylenol PRN, topical biofreeze or similar OTC.   Close f/u with PCP in 3 days, sooner PRN.   Advised to schedule annual physical.     Future Appointments   Date Time Provider Department Center   3/12/2025  6:00 PM Neva Ocasio MD [facility]

## 2025-03-05 NOTE — TELEPHONE ENCOUNTER
ED  Discharged  3/5/2025 (1 hours)  [facility]     Anival Scott MD  Last attending  Treatment team Chest pain of uncertain etiology  Clinical impression Chest Pain Angina  Chief complaint     ED Provider Notes  Anival Scott MD (Physician)  Emergency Medicine  Expand All Collapse All     Patient Seen in: [facility]

## 2025-03-05 NOTE — ED PROVIDER NOTES
Patient Seen in: [facility] Emergency Department    History     Chief Complaint   Patient presents with    Chest Pain Angina     Stated Complaint: c/o L rib pain since Sunday, denies injury, hurts with deep breathing    Subjective:   HPI      34-year-old female present with chest pain patient's been having 6 days of chest pain that initially started in the retrosternal region has now moved and localized underneath the left breast more of a dull achy type of pain.  No cough cold runny nose or any other exacerbating relieving factor associated symptom    Objective:     Past Medical History:    Anxiety    Depression    Gallbladder polyp    Infectious mononucleosis    Monoallelic mutation of BREE gene    +BREE c.8147T>C ([id number]) likely pathogenic variant, report in media tab    Overactive bladder              Past Surgical History:   Procedure Laterality Date    Other surgical history  2010 and 2014    [place] teeth    [place] teeth removed  2010                Social History     Socioeconomic History    Marital status: Single    Number of children: 0   Occupational History    Occupation: , history     Employer:  [employer]   Tobacco Use    Smoking status: Never    Smokeless tobacco: Never   Vaping Use    Vaping status: Never Used   Substance and Sexual Activity    Alcohol use: Yes     Comment: social occasional    Drug use: Not Currently     Types: Cannabis    Sexual activity: Yes   Other Topics Concern    Caffeine Concern Yes     Comment: 0-1 CUPS PER DAY    Sleep Concern No    Stress Concern No    Exercise Yes    Seat Belt Yes                  Physical Exam     ED Triage Vitals [03/05/25 0233]   /77   Pulse 74   Resp 18   Temp 98.1 °F (36.7 °C)   Temp src Oral   SpO2 97 %   O2 Device None (Room air)       Current Vitals:   Vital Signs  BP: 107/71  Pulse: 68  Resp: 22  Temp: 98.1 °F (36.7 °C)  Temp src: Oral  MAP (mmHg): 83    Oxygen Therapy  SpO2: 100 %  O2 Device: None  (Room air)        Physical Exam  Awake alert patient appears no distress HEENT exam is normal lungs are clear cardiovascular exam regular rhythm abdomen soft nontender extremities no COVID cyanosis or edema no rash back exam is normal skin is nondiaphoretic    ED Course     Labs Reviewed   CBC WITH DIFFERENTIAL WITH PLATELET - Abnormal; Notable for the following components:       Result Value    MCV 78.0 (*)     MCH 25.1 (*)     All other components within normal limits   COMP METABOLIC PANEL (14) - Normal   TROPONIN I HIGH SENSITIVITY - Normal   D-DIMER - Normal   RAINBOW DRAW LAVENDER   RAINBOW DRAW LIGHT GREEN   RAINBOW DRAW BLUE     EKG    Rate, intervals and axes as noted on EKG Report.  Rate: 71  Rhythm: Sinus Rhythm  Reading: No areas of acute ST segment elevation or depression                Differential diagnosis includes pulm embolism acute coronary syndrome       MDM              Medical Decision Making  34-year-old female presenting to the emergency department for chest pain IV established cardiac monitor shows a sinus rhythm pulse ox shows no signs of hypoxia.  CBC shows normal white cell count metabolic panel stable renal function: Shows elevation negative of NSTEMI and D-dimer within acceptable limits of the patient's independent interpretation by ED physician of chest x-ray shows no focal traits patient is low risk for ACS will be discharged home is to return emerged part worsening symptoms other complaints  The patient was screened and evaluated during this visit.  As a treating physician attending to the patient, I determined, within reasonable clinical confidence and prior to discharge, that an emergency medical condition was not or was no longer present.  There was no indication for further evaluation, treatment or admission on an emergency basis.    The usual and customary discharge instructions were discussed given the patient's ER course.  We discussed signs and symptoms that should prompt the  patient's immediate return to the emergency department.  Reasonable over-the-counter and prescription treatment options and physician follow-up plan was discussed.  Patient was discharged home in good condition  This note was prepared using Dragon Medical voice recognition dictation software.  As a result errors may occur.  When identified to these areas have been corrected.  While every attempt is made to correct errors during dictation discrepancies may still exist.  Please contact if there are any errors    Problems Addressed:  Chest pain of uncertain etiology: acute illness or injury    Amount and/or Complexity of Data Reviewed  Labs: ordered. Decision-making details documented in ED Course.  Radiology: ordered and independent interpretation performed. Decision-making details documented in ED Course.  ECG/medicine tests: ordered and independent interpretation performed. Decision-making details documented in ED Course.        Disposition and Plan     Clinical Impression:  1. Chest pain of uncertain etiology         Disposition:  There is no disposition on file for this visit.  There is no disposition time on file for this visit.    Follow-up:  Neva Ocasio MD  [address] [place] [id number]  [phone]    Follow up in 1 week(s)            Medications Prescribed:  Current Discharge Medication List              Supplementary Documentation:

## 2025-03-12 ENCOUNTER — OFFICE VISIT (OUTPATIENT)
Dept: INTERNAL MEDICINE CLINIC | Facility: CLINIC | Age: 35
End: 2025-03-12

## 2025-03-12 VITALS
SYSTOLIC BLOOD PRESSURE: 123 MMHG | WEIGHT: 176.63 LBS | BODY MASS INDEX: 34.68 KG/M2 | TEMPERATURE: 97 F | HEART RATE: 81 BPM | DIASTOLIC BLOOD PRESSURE: 80 MMHG | HEIGHT: 60 IN | OXYGEN SATURATION: 98 %

## 2025-03-12 DIAGNOSIS — Z15.01 MONOALLELIC MUTATION OF ATM GENE: ICD-10-CM

## 2025-03-12 DIAGNOSIS — K82.4 GALLBLADDER POLYP: ICD-10-CM

## 2025-03-12 DIAGNOSIS — T14.8XXA MUSCLE STRAIN: Primary | ICD-10-CM

## 2025-03-12 DIAGNOSIS — Z15.09 MONOALLELIC MUTATION OF ATM GENE: ICD-10-CM

## 2025-03-12 DIAGNOSIS — F41.9 ANXIETY: ICD-10-CM

## 2025-03-12 DIAGNOSIS — D68.59 ANTITHROMBIN III DEFICIENCY (HCC): ICD-10-CM

## 2025-03-12 DIAGNOSIS — Z80.3 FH: BREAST CANCER IN FIRST DEGREE RELATIVE WHEN <50 YEARS OLD: ICD-10-CM

## 2025-03-12 DIAGNOSIS — Z15.89 MONOALLELIC MUTATION OF ATM GENE: ICD-10-CM

## 2025-03-12 PROCEDURE — 3079F DIAST BP 80-89 MM HG: CPT | Performed by: INTERNAL MEDICINE

## 2025-03-12 PROCEDURE — 3074F SYST BP LT 130 MM HG: CPT | Performed by: INTERNAL MEDICINE

## 2025-03-12 PROCEDURE — G2211 COMPLEX E/M VISIT ADD ON: HCPCS | Performed by: INTERNAL MEDICINE

## 2025-03-12 PROCEDURE — 99214 OFFICE O/P EST MOD 30 MIN: CPT | Performed by: INTERNAL MEDICINE

## 2025-03-12 PROCEDURE — 3008F BODY MASS INDEX DOCD: CPT | Performed by: INTERNAL MEDICINE

## 2025-03-12 RX ORDER — ALPRAZOLAM 0.25 MG
0.25 TABLET ORAL DAILY PRN
Qty: 30 TABLET | Refills: 0 | Status: SHIPPED | OUTPATIENT
Start: 2025-03-12

## 2025-03-12 NOTE — PROGRESS NOTES
Cuco Gonzalez is a 34 year old female.  Chief Complaint   Patient presents with    ER F/U       HPI:   Patient comes for follow-up- last seen in 7/2023   C/C ER follow-up  C/o rib pain - left side back under the bra line   Initially saw NP and was thought to be costochondritis advised Tylenol topical Biofreeze or similar over-the-counter topical agents and she tried he had and it made it better next to a but since she has a history of the family history of breast cancer she get got nervous and went to the ER and x-ray was done and blood work was done all negative  Still has tenderness like a muscle ache   No falls trauma or injury    Still needs to see the surg for the gallbladder polyp and also the breast specialist     Per surgeon   Family hx of bile duct cancer and GB dz.  Discussed in detail with patient and options reviewed. Plan on HIDA scan.  If + for dyskinesia then proceed with lap choley. If negative then medical and/or GI follow up for atypical sx and repeat GB US in one year.        HISTORY  2023 VISIT    patient now lives and works in [place] and would like to follow-up with as many doctors that she can for convenience sake in [place]  grandma passed away ( she had liver kidney and GI ) and she had BREE gene mutation and her mom is a carrier so she got tested too   Mri breast was rec   Seeing a physiatrist - q2 mns and meds given by her - through [facility] - dr miguel nassar   Takes xanax prn - and needs a refills      Thinks she is ? 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[place]   [place]   Comes home 2-3 days of 5 days crying bc she doesn't liek the people she works with   Noted that blood pressure is a little bit on  the low side but she does not have any dizziness and review of systems as stated below      PMH  H/o hpv sees dr blackman   Anxiety and depression on fluoxetine and hydroxyzine and prn xanax   UI -sued to see dr naidu the urologist and w/u neg --it is due to anxiety   First-degree relative with breast cancer-mom diag at age 44   BREE gene mutation   Antithrombin III deficiency    Current Outpatient Medications   Medication Sig Dispense Refill    ALPRAZolam 0.25 MG Oral Tab Take 1 tablet (0.25 mg total) by mouth daily as needed. 30 tablet 0    FLUoxetine HCl 20 MG Oral Cap Take 2 capsules (40 mg total) by mouth 2 (two) times daily. Take 1 tablet in the a.m. and 2 tablets in the p.m      hydrOXYzine HCl 10 MG Oral Tab Take 2 tablets (20 mg total) by mouth in the morning and 2 tablets (20 mg total) before bedtime. Patient reports taking 2 tablet BID .        Past Medical History:    Anxiety    Depression    Gallbladder polyp    Infectious mononucleosis    Monoallelic mutation of BREE gene    +BREE c.8147T>C ([id number]) likely pathogenic variant, report in media tab    Overactive bladder      Past Surgical History:   Procedure Laterality Date    Other surgical history  2010 and 2014    [place] teeth    [place] teeth removed  2010      Social History:  Social History     Socioeconomic History    Marital status: Single    Number of children: 0   Occupational History    Occupation: , history     Employer:  [employer]   Tobacco Use    Smoking status: Never    Smokeless tobacco: Never   Vaping Use    Vaping status: Never Used   Substance and Sexual Activity    Alcohol use: Yes     Comment: social occasional    Drug use: Not Currently     Types: Cannabis    Sexual activity: Yes   Other Topics Concern    Caffeine Concern Yes     Comment: 0-1 CUPS PER DAY    Sleep Concern No    Stress Concern No    Exercise Yes    Seat Belt Yes        REVIEW OF SYSTEMS:   GENERAL HEALTH: No fevers, chills, sweats,  fatigue  RESPIRATORY: denies shortness of breath, cough, wheezing, had some pain and also difficulty and shortness of breath when taking a deep breath in and that is why she went to the ER and today that is much better but she still has it sometimes  CARDIOVASCULAR: denies chest pain on exertion, palpitations, swelling in feet  NEURO: denies headaches ,+ anxiety, depression-stable on medication    EXAM:   /80   Pulse 81   Temp 97.2 °F (36.2 °C)   Ht 5' (1.524 m)   Wt 176 lb 9.6 oz (80.1 kg)   LMP 02/24/2025 (Approximate)   SpO2 98%   BMI 34.49 kg/m²   GENERAL: well developed, well nourished,in no apparent distress  SKIN BREAST: Bilateral breasts without any lumps, bilateral axilla without any lymphadenopathy, no nipple retraction or  discharge  Noted she does have very dense breasts and the left breast does appear a little bit bigger than the right    HEENT: atraumatic, normocephalic  NECK: supple,no adenopathy  LUNGS: clear to auscultation, no wheeze, no pain on inhalation today  CARDIO: RRR without murmur  EXTREMITIES: no cyanosis, or edema  Noted some redness under her left breast at the edge of the bra line and noted she has large breasts which may be contributing to pulling on that area and tenderness that extends from the medial aspect of the left breast towards the outer portion    ASSESSMENT AND PLAN:   Diagnoses and all orders for this visit:    Muscle strain  Advised patient to continue to do cold compresses topical agents, advised for her to wear wide strap bras    Antithrombin III deficiency (HCC)  -     Oncology/Hematology Referral - In Network  And   Monoallelic mutation of BREE gene  -     Cancel: OP REFERRAL TO SURGICAL ONCOLOGY  -     OP REFERRAL TO SURGICAL ONCOLOGY  And   FH: breast cancer in first degree relative when <50 years old  -     Cancel: OP REFERRAL TO SURGICAL ONCOLOGY  -     OP REFERRAL TO SURGICAL ONCOLOGY  Refer for evaluation, she has already gone for genetic  counseling  Gallbladder polyp  -     Gastro Referral - In Network  Refer-she is thinking of getting the gallbladder taken out as many family numbers have gallbladder issues currently she is stable    Anxiety  -     ALPRAZolam 0.25 MG Oral Tab; Take 1 tablet (0.25 mg total) by mouth daily as needed.  Stable on medications, uses it rarely-refilled        Preventative medicine   Pap- 2/19 normal and 6/2021 dr blackman at [facility] and now sees dr deanne hinton 11/2022   Labs to be done once fasting  Mammogram 12/2022     The patient indicates understanding of these issues and agrees to the plan.  No follow-ups on file.

## 2025-03-25 RX ORDER — HYDROXYZINE HYDROCHLORIDE 10 MG/1
20 TABLET, FILM COATED ORAL 2 TIMES DAILY
Qty: 120 TABLET | Refills: 0 | Status: SHIPPED | OUTPATIENT
Start: 2025-03-25

## 2025-04-14 ENCOUNTER — APPOINTMENT (OUTPATIENT)
Age: 35
End: 2025-04-14

## 2025-04-14 DIAGNOSIS — F33.0 MAJOR DEPRESSIVE DISORDER, RECURRENT EPISODE, MILD (CMD): Primary | ICD-10-CM

## 2025-04-14 DIAGNOSIS — F41.1 GAD (GENERALIZED ANXIETY DISORDER): ICD-10-CM

## 2025-04-14 DIAGNOSIS — F42.2 MIXED OBSESSIONAL THOUGHTS AND ACTS: ICD-10-CM

## 2025-04-14 PROCEDURE — 99213 OFFICE O/P EST LOW 20 MIN: CPT | Performed by: PSYCHIATRY & NEUROLOGY

## 2025-04-14 PROCEDURE — G2211 COMPLEX E/M VISIT ADD ON: HCPCS | Performed by: PSYCHIATRY & NEUROLOGY

## 2025-04-14 RX ORDER — HYDROXYZINE HYDROCHLORIDE 10 MG/1
20 TABLET, FILM COATED ORAL 2 TIMES DAILY
Qty: 120 TABLET | Refills: 2 | Status: SHIPPED | OUTPATIENT
Start: 2025-04-14

## 2025-08-25 ENCOUNTER — TELEPHONE (OUTPATIENT)
Age: 35
End: 2025-08-25

## 2025-08-25 DIAGNOSIS — F41.1 GAD (GENERALIZED ANXIETY DISORDER): Primary | ICD-10-CM

## 2025-08-26 RX ORDER — HYDROXYZINE HYDROCHLORIDE 10 MG/1
20 TABLET, FILM COATED ORAL 2 TIMES DAILY
Qty: 120 TABLET | Refills: 0 | Status: SHIPPED | OUTPATIENT
Start: 2025-08-26

## 2025-09-22 ENCOUNTER — APPOINTMENT (OUTPATIENT)
Age: 35
End: 2025-09-22

## (undated) DIAGNOSIS — F41.9 ANXIETY: ICD-10-CM

## (undated) DIAGNOSIS — K82.4 GALLBLADDER POLYP: Primary | ICD-10-CM

## (undated) NOTE — LETTER
9/23/2024          To Whom It May Concern:    Cuco Gonzalez is currently under my medical care and may not return to work at this time.    Please excuse Cuco for 3 days.  She may return to work on 9/26/2024.  Activity is restricted as follows: none.    If you require additional information please contact our office.        Sincerely,         NUZHAT Aburto          Document generated by:  NUZHAT Aburto

## (undated) NOTE — LETTER
11/16/2020          To Whom It May Concern:    Rose Marie Vargas is currently under my medical care and may not return to work at this time. It is advised she stay home for the next 3 days, pending covid test results.    If you require additional informat

## (undated) NOTE — LETTER
Caterina Frankel, [address] [facility]  [address] [facility]       01/23/20        Patient: Isatu Amaya   YOB: 1990   Date of Visit: 1/22/2020       Dear  Dr. Marleni Mccrary MD,      Thank you for referring Isatu Amaya to my practice.